# Patient Record
Sex: MALE | URBAN - METROPOLITAN AREA
[De-identification: names, ages, dates, MRNs, and addresses within clinical notes are randomized per-mention and may not be internally consistent; named-entity substitution may affect disease eponyms.]

---

## 2017-04-06 ENCOUNTER — HOSPITAL ENCOUNTER (OUTPATIENT)
Dept: CT IMAGING | Facility: HOSPITAL | Age: 47
Discharge: HOME | End: 2017-04-06

## 2017-04-06 ENCOUNTER — ALLSCRIPTS OFFICE VISIT (OUTPATIENT)
Dept: OTHER | Facility: OTHER | Age: 47
End: 2017-04-06

## 2017-04-06 ENCOUNTER — HOSPITAL ENCOUNTER (OUTPATIENT)
Dept: ULTRASOUND IMAGING | Facility: HOSPITAL | Age: 47
Discharge: HOME | End: 2017-04-06

## 2017-04-06 ENCOUNTER — HOSPITAL ENCOUNTER (OUTPATIENT)
Dept: NON INVASIVE DIAGNOSTICS | Facility: CLINIC | Age: 47
Discharge: HOME | End: 2017-04-06

## 2017-04-06 ENCOUNTER — APPOINTMENT (OUTPATIENT)
Dept: LAB | Facility: CLINIC | Age: 47
End: 2017-04-06

## 2017-04-06 ENCOUNTER — TRANSCRIBE ORDERS (OUTPATIENT)
Dept: LAB | Facility: CLINIC | Age: 47
End: 2017-04-06

## 2017-04-06 ENCOUNTER — OFFICE VISIT (OUTPATIENT)
Dept: LAB | Facility: CLINIC | Age: 47
End: 2017-04-06

## 2017-04-06 DIAGNOSIS — Z00.00 ENCOUNTER FOR GENERAL ADULT MEDICAL EXAMINATION WITHOUT ABNORMAL FINDINGS: ICD-10-CM

## 2017-04-06 DIAGNOSIS — Z00.00 ROUTINE GENERAL MEDICAL EXAMINATION AT A HEALTH CARE FACILITY: ICD-10-CM

## 2017-04-06 DIAGNOSIS — Z00.00 ROUTINE GENERAL MEDICAL EXAMINATION AT A HEALTH CARE FACILITY: Primary | ICD-10-CM

## 2017-04-06 LAB
25(OH)D3 SERPL-MCNC: 10.5 NG/ML (ref 30–100)
ALBUMIN SERPL BCP-MCNC: 4.2 G/DL (ref 3.5–5)
ALP SERPL-CCNC: 81 U/L (ref 46–116)
ALT SERPL W P-5'-P-CCNC: 48 U/L (ref 12–78)
ANION GAP SERPL CALCULATED.3IONS-SCNC: 8 MMOL/L (ref 4–13)
AST SERPL W P-5'-P-CCNC: 32 U/L (ref 5–45)
ATRIAL RATE: 62 BPM
ATRIAL RATE: 62 BPM
BACTERIA UR QL AUTO: ABNORMAL /HPF
BASOPHILS # BLD AUTO: 0.03 THOUSANDS/ΜL (ref 0–0.1)
BASOPHILS NFR BLD AUTO: 1 % (ref 0–1)
BILIRUB SERPL-MCNC: 0.3 MG/DL (ref 0.2–1)
BILIRUB UR QL STRIP: NEGATIVE
BUN SERPL-MCNC: 12 MG/DL (ref 5–25)
CALCIUM SERPL-MCNC: 8.8 MG/DL (ref 8.3–10.1)
CHEST PAIN STATEMENT: NORMAL
CHLORIDE SERPL-SCNC: 103 MMOL/L (ref 100–108)
CHOLEST SERPL-MCNC: 206 MG/DL (ref 50–200)
CLARITY UR: CLEAR
CO2 SERPL-SCNC: 31 MMOL/L (ref 21–32)
COLOR UR: YELLOW
CREAT SERPL-MCNC: 1.07 MG/DL (ref 0.6–1.3)
CRP SERPL HS-MCNC: <0.9 MG/L
EOSINOPHIL # BLD AUTO: 0.18 THOUSAND/ΜL (ref 0–0.61)
EOSINOPHIL NFR BLD AUTO: 3 % (ref 0–6)
ERYTHROCYTE [DISTWIDTH] IN BLOOD BY AUTOMATED COUNT: 13.1 % (ref 11.6–15.1)
EST. AVERAGE GLUCOSE BLD GHB EST-MCNC: 103 MG/DL
GFR SERPL CREATININE-BSD FRML MDRD: >60 ML/MIN/1.73SQ M
GLUCOSE P FAST SERPL-MCNC: 101 MG/DL (ref 65–99)
GLUCOSE UR STRIP-MCNC: NEGATIVE MG/DL
HBA1C MFR BLD: 5.2 % (ref 4.2–6.3)
HCT VFR BLD AUTO: 46.7 % (ref 36.5–49.3)
HDLC SERPL-MCNC: 53 MG/DL (ref 40–60)
HGB BLD-MCNC: 16 G/DL (ref 12–17)
HGB UR QL STRIP.AUTO: NEGATIVE
KETONES UR STRIP-MCNC: NEGATIVE MG/DL
LDLC SERPL CALC-MCNC: 121 MG/DL (ref 0–100)
LEUKOCYTE ESTERASE UR QL STRIP: NEGATIVE
LYMPHOCYTES # BLD AUTO: 1.8 THOUSANDS/ΜL (ref 0.6–4.47)
LYMPHOCYTES NFR BLD AUTO: 32 % (ref 14–44)
MAX DIASTOLIC BP: 82 MMHG
MAX HEART RATE: 176 BPM
MAX PREDICTED HEART RATE: 172 BPM
MAX. SYSTOLIC BP: 160 MMHG
MCH RBC QN AUTO: 32.5 PG (ref 26.8–34.3)
MCHC RBC AUTO-ENTMCNC: 34.3 G/DL (ref 31.4–37.4)
MCV RBC AUTO: 95 FL (ref 82–98)
MONOCYTES # BLD AUTO: 0.67 THOUSAND/ΜL (ref 0.17–1.22)
MONOCYTES NFR BLD AUTO: 12 % (ref 4–12)
NEUTROPHILS # BLD AUTO: 2.92 THOUSANDS/ΜL (ref 1.85–7.62)
NEUTS SEG NFR BLD AUTO: 52 % (ref 43–75)
NITRITE UR QL STRIP: NEGATIVE
NON-SQ EPI CELLS URNS QL MICRO: ABNORMAL /HPF
P AXIS: 30 DEGREES
P AXIS: 32 DEGREES
PH UR STRIP.AUTO: 6.5 [PH] (ref 4.5–8)
PLATELET # BLD AUTO: 189 THOUSANDS/UL (ref 149–390)
PMV BLD AUTO: 10.1 FL (ref 8.9–12.7)
POTASSIUM SERPL-SCNC: 4.4 MMOL/L (ref 3.5–5.3)
PR INTERVAL: 140 MS
PR INTERVAL: 140 MS
PROT SERPL-MCNC: 7.3 G/DL (ref 6.4–8.2)
PROT UR STRIP-MCNC: ABNORMAL MG/DL
PROTOCOL NAME: NORMAL
PSA SERPL-MCNC: 2 NG/ML (ref 0–4)
QRS AXIS: 46 DEGREES
QRS AXIS: 49 DEGREES
QRSD INTERVAL: 78 MS
QRSD INTERVAL: 78 MS
QT INTERVAL: 392 MS
QT INTERVAL: 394 MS
QTC INTERVAL: 397 MS
QTC INTERVAL: 399 MS
RBC # BLD AUTO: 4.93 MILLION/UL (ref 3.88–5.62)
RBC #/AREA URNS AUTO: ABNORMAL /HPF
REASON FOR TERMINATION: NORMAL
SODIUM SERPL-SCNC: 142 MMOL/L (ref 136–145)
SP GR UR STRIP.AUTO: 1.01 (ref 1–1.03)
T WAVE AXIS: 30 DEGREES
T WAVE AXIS: 35 DEGREES
TARGET HR FORMULA: NORMAL
TEST INDICATION: NORMAL
TIME IN EXERCISE PHASE: 780 S
TRIGL SERPL-MCNC: 158 MG/DL
TSH SERPL DL<=0.05 MIU/L-ACNC: 2.65 UIU/ML (ref 0.36–3.74)
UROBILINOGEN UR QL STRIP.AUTO: 0.2 E.U./DL
VENTRICULAR RATE: 62 BPM
VENTRICULAR RATE: 62 BPM
WBC # BLD AUTO: 5.6 THOUSAND/UL (ref 4.31–10.16)
WBC #/AREA URNS AUTO: ABNORMAL /HPF

## 2017-04-06 PROCEDURE — 84443 ASSAY THYROID STIM HORMONE: CPT

## 2017-04-06 PROCEDURE — 93005 ELECTROCARDIOGRAM TRACING: CPT

## 2017-04-06 PROCEDURE — 81001 URINALYSIS AUTO W/SCOPE: CPT

## 2017-04-06 PROCEDURE — 86141 C-REACTIVE PROTEIN HS: CPT

## 2017-04-06 PROCEDURE — 80053 COMPREHEN METABOLIC PANEL: CPT

## 2017-04-06 PROCEDURE — 75571 CT HRT W/O DYE W/CA TEST: CPT

## 2017-04-06 PROCEDURE — 76700 US EXAM ABDOM COMPLETE: CPT

## 2017-04-06 PROCEDURE — 80061 LIPID PANEL: CPT

## 2017-04-06 PROCEDURE — 84153 ASSAY OF PSA TOTAL: CPT

## 2017-04-06 PROCEDURE — 93306 TTE W/DOPPLER COMPLETE: CPT

## 2017-04-06 PROCEDURE — 83036 HEMOGLOBIN GLYCOSYLATED A1C: CPT

## 2017-04-06 PROCEDURE — 93350 STRESS TTE ONLY: CPT

## 2017-04-06 PROCEDURE — 36415 COLL VENOUS BLD VENIPUNCTURE: CPT

## 2017-04-06 PROCEDURE — 85025 COMPLETE CBC W/AUTO DIFF WBC: CPT

## 2017-04-06 PROCEDURE — 82306 VITAMIN D 25 HYDROXY: CPT

## 2017-10-20 ENCOUNTER — APPOINTMENT (OUTPATIENT)
Dept: URBAN - METROPOLITAN AREA CLINIC 200 | Age: 47
Setting detail: DERMATOLOGY
End: 2017-10-24

## 2017-10-20 DIAGNOSIS — L57.0 ACTINIC KERATOSIS: ICD-10-CM

## 2017-10-20 DIAGNOSIS — L57.8 OTHER SKIN CHANGES DUE TO CHRONIC EXPOSURE TO NONIONIZING RADIATION: ICD-10-CM

## 2017-10-20 PROBLEM — J30.1 ALLERGIC RHINITIS DUE TO POLLEN: Status: ACTIVE | Noted: 2017-10-20

## 2017-10-20 PROCEDURE — OTHER LIQUID NITROGEN: OTHER

## 2017-10-20 PROCEDURE — 17003 DESTRUCT PREMALG LES 2-14: CPT

## 2017-10-20 PROCEDURE — OTHER COUNSELING: OTHER

## 2017-10-20 PROCEDURE — 99213 OFFICE O/P EST LOW 20 MIN: CPT | Mod: 25

## 2017-10-20 PROCEDURE — 17000 DESTRUCT PREMALG LESION: CPT

## 2017-10-20 ASSESSMENT — LOCATION DETAILED DESCRIPTION DERM
LOCATION DETAILED: LEFT CENTRAL TEMPLE
LOCATION DETAILED: RIGHT SUPERIOR MEDIAL UPPER BACK
LOCATION DETAILED: RIGHT MEDIAL TEMPLE

## 2017-10-20 ASSESSMENT — LOCATION SIMPLE DESCRIPTION DERM
LOCATION SIMPLE: LEFT TEMPLE
LOCATION SIMPLE: RIGHT TEMPLE
LOCATION SIMPLE: RIGHT UPPER BACK

## 2017-10-20 ASSESSMENT — LOCATION ZONE DERM
LOCATION ZONE: FACE
LOCATION ZONE: TRUNK

## 2017-10-20 NOTE — PROCEDURE: LIQUID NITROGEN
Post-Care Instructions: I reviewed with the patient in detail post-care instructions. Patient is to wear sunprotection, and avoid picking at any of the treated lesions. Pt may apply Vaseline to crusted or scabbing areas.
Consent: The patient's consent was obtained including but not limited to risks of crusting, scabbing, blistering, scarring, darker or lighter pigmentary change, recurrence, incomplete removal and infection.
Number Of Freeze-Thaw Cycles: 2 freeze-thaw cycles
Detail Level: Detailed
Duration Of Freeze Thaw-Cycle (Seconds): 10
Render Post-Care Instructions In Note?: no

## 2018-01-22 VITALS
SYSTOLIC BLOOD PRESSURE: 132 MMHG | RESPIRATION RATE: 12 BRPM | BODY MASS INDEX: 23.95 KG/M2 | HEIGHT: 70 IN | DIASTOLIC BLOOD PRESSURE: 80 MMHG | TEMPERATURE: 96.6 F | HEART RATE: 68 BPM | WEIGHT: 167.3 LBS

## 2018-03-07 NOTE — H&P
Executive Physical Summary:   Carmine Queen, please continue your regular exercise, and healthy diet during the day  Avoiding snacking at night is a good goal     Because of the change in vision you have noticed, please make an appointment with her ophthalmologist     Suzanne Orellana are fair skinned with blue eyes and have had sun exposure in the past  I recommend once yearly dermatology screening  Please wear hat when outdoors, continue to wear sunglasses on michelle days, and wear sunscreen consistently  Dermatology: for your eczema: Please consider applying Eucerin cream just after taking a shower each day  It appears that the lipoma on her left neck is regrowing  This seems completely benign and does not need to be removed unless he would decide to do so  Urology follow-up: With your family history of prostate cancer, and recent PSA of 2 5, your active follow-up with urologist should continue for prostate cancer screening  Please continue to stay well hydrated and report any symptoms of renal colic to your urologist  Adding lemon to your water may also decrease the chance of kidney stones  Low Vitamin D level: please take over-the-counter Vitamin D3 2000 units once a day  Continue Advair which seems to be controlling your mild persistent asthma quite well  Please have your rescue inhaler on hand just in case  Regarding your concerns about her mother having lung cancer: Today's CT for lung cancer screening is a good start  Current guidelines do not recommend routine screening of this sort in your risk category  Fourthly, despite her family history and history of smoking more than 20 years ago, it appears your low risk  Nutritional Summary and Recommendations:   Carmine Queen presents for nutrition counseling and education  Food recall identifies suboptimal nutrient and energy balance  Body composition results reveal a desired improvement in body fat mass   Lab values reveal deficient serum Vit D and elevated triglycerides  Nutrition therapy discussed  HT: 69 5", WT: 165 2#, BMI 24 0, BMR 1659 kcal, Fat Mass 41 0#, Desired fat mass 35 0#  Meds: reviewed  Labs: 4/6 Glu 101, Chol 206, , Trig 158, HDL 53, Vit D 10 5, HgbA1C 5 2  Goals:  1  Lacy Abernathy will consume 15-25 grams protein per meals and 10-20 gram protein per 1-2 snack daily for a daily total of 90 gram protein  2  Lacy Abernathy will food journal with [facility] will have a Vit D level wnl in 6 months  Expected Compliance: Very Good Perceived Comprehension: Very Good  Fitness Summary and Recommendations:    Lacy Abernathy scored at the 25% on his body composition assessment with a bodyfat % of 24 8%  Lacy Abernathy scored in the above average range for flexibility with a sit & reach score of 27 cm  His Muscle Strength/Endurance scores placed him at the 55% (lower body) and 80% (upper body) with a chair stand score of 24 and arm curl score of 25 respectfully  Christopher's Cardiovascular Score of 57 2 placed him at the 95%  Overall Lacy Abernathy would be considered to have an above average fitness level with a 65% score  Continued emphasis on regular exercise and sound nutrition practices will enable Lacy Abernathy to maintain his optimal level of fitness  Hearing Assessment Summary:   Mr Tabatha Prince was seen for a hearing evaluation as part of his [facility] Physical  Otoscopy was normal bilaterally  Tympanometry was normal bilaterally  Pure tone audiometry revealed normal hearing with excellent word recognition bilaterally  He should have his hearing re evaluated every other year, or soon if he feels there is a problem                             Electronically signed by : SHERYL Barrera ; Apr 6 2017  2:33PM EST                       (Author)

## 2018-03-07 NOTE — H&P
HPI Data Review  Johnathan Barajas is a very nice 44-year-old man who is here for his first [facility] physical  Johnathan Barajas does not have any acute complaints today  In addition to taking her verbal history, Christopher's health history form was also reviewed  His biggest health concerns are related to cancer prevention  He has a very strong family history of prostate cancer in his father, grandfather, great grandfather and uncle all on his father's side  He is actively followed by his urologist and recent PSA was 2 5  Johnathan Barajas has regular prostate exams, PSA measurement, and currently has no urinary symptoms  His mother  of lung cancer and Johnathan Barajas did smoke until  and had substantial secondhand smoke exposure as a child  Today's testing was expanded to include low-dose lungs CTA  Johnathan Barajas has a history of nephrolithiasis with 2 episodes of symptomatic renal colic, once 2 years ago and once in February of this year  He is followed actively by his urologist  Johnathan Barajas is undergoing testing to identify the type of kidney stones he has  He underwent lithotripsy in February of this year  Currently he has no hematuria, dysuria, flank pain or other symptoms of renal colic  He has substantially increase his fluid intake  Johnathan Barajas has lifelong eczema and environmental allergies to mold and mildew, and possibly to the 2 cats he hasn't home  He has mild intermittent asthma followed by his primary care doctor and uses Advair 100/50 one relation once daily  This controls his asthma quite well year-round  He does have a rescue medicine but rarely uses this  Review of Systems    Constitutional: No fever or chills, feels well, no tiredness, no recent weight gain or weight loss  Eyes: eyesight problems and Some declining near vision recently  , but no eye pain, no dryness of the eyes, eyes not red, no purulent discharge from the eyes and no itching of the eyes     ENT: hearing loss and Some subtle loss of hearing, very gradual , but no earache, no nosebleeds, no sore throat, no nasal discharge and no hoarseness  Cardiovascular: No complaints of slow heart rate, no fast heart rate, no chest pain, no palpitations, no leg claudication, no lower extremity  Respiratory: wheezing and Occasional mild wheezing with underlying mild persistent asthma on Advair  , but no shortness of breath, no cough, no orthopnea, no shortness of breath during exertion and no PND  Gastrointestinal: No complaints of abdominal pain, no constipation, no nausea or vomiting, no diarrhea or bloody stools  Genitourinary: No complaints of dysuria, no incontinence, no hesitancy, no nocturia, no genital lesion, no testicular pain  Musculoskeletal: myalgias and Occasional stiffness and pain at the origin of the left trapezius muscle in the occipital zone of the head  This seems to be related to suboptimal posture especially looking a computer at work , but no arthralgias, no joint swelling, no limb pain, no joint stiffness and no limb swelling  Integumentary: a rash, itching, dry skin, skin lesion and Chronic eczema with dry skin and occasional itching, skin tags ringing the neck, and some red marks which on exam or hemangiomas  , but no skin wound  Neurological: No compliants of headache, no confusion, no convulsions, no numbness or tingling, no dizziness or fainting, no limb weakness, no difficulty walking  Psychiatric: Is not suicidal, no sleep disturbances, no anxiety or depression, no change in personality, no emotional problems  Endocrine: No complaints of proptosis, no hot flashes, no muscle weakness, no erectile dysfunction, no deepening of the voice, no feelings of weakness  Hematologic/Lymphatic: No complaints of swollen glands, no swollen glands in the neck, does not bleed easily, no easy bruising  Past Medical History  No significant past medical problems  This includes no history of possible sedation for medical reasons  Surgical History    1   History of Inguinal Hernia Repair   2  History of Renal Lithotripsy  Right inguinal hernia repair      2 lithotripsies, the latest was in February of this year for symptomatically colic with retained kidney stones  Removal of lipoma of left neck  Family History  Mother    1  Family history of lung cancer (V16 1) (Z80 1)  Father    2  Family history of malignant neoplasm of prostate (V16 42) (Z80 45)  Paternal Grandfather    3  Family history of malignant neoplasm of prostate (V16 42) (Z80 45)  Paternal Uncle    4  Family history of malignant neoplasm of prostate (V16 42) (Z80 45)  Father: His father is alive age 76 with prostate cancer in remission  Christopher's paternal grandfather, paternal great-grandfather and a paternal uncle all had prostate cancer  Mother: Mother  at the age of 77 of lung cancer and smoked heavily during her life  Siblings: Alla Wilde has 2 brothers, ages 46 and 48, one with high cholesterol but they both generally good health  Children: 2 teenage children who are in good health  Social History    · Currently working   · Exercise: Running   · Exercise: Weight training   · Former smoker (D50 05) (Z49 480)   ·   Alla Wilde is , works full-time as a , exercises regularly and his diet seems optimal other than eating at night, which is generally snacking  He is a former smoker having quit smoking in   He had substantial secondhand smoke exposure as a child as both of his parents smoked  He has 1-2 cups of coffee a day and alcohol intake is very modest  He enjoys playing golf with his son 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       Allergies    1   Codeine Derivatives    Vitals  Signs   Recorded: [id number] 09:58AM   Temperature: 96 6 F, Tympanic  Heart Rate: 68, L Radial  Pulse Quality: Regular, L Radial  Respiration Quality: Normal  Respiration: 12  Systolic: 642, LUE, Supine  Diastolic: 80, LUE, Supine  Height: 5 ft 9 75 in  Weight: 167 lb 4 8 oz  BMI Calculated: 24 18  BSA Calculated: 1 93    Physical Exam  VA: OD- 20/15, OS- 20/30     Constitutional   General appearance: No acute distress, well appearing and well nourished  Head and Face   Head and face: Normal   Slight male pattern balding  There is no acute traumatic  Eyes   Conjunctiva and lids: No erythema, swelling or discharge  Pupils and irises: Equal, round, reactive to light  Pupils 3 mm with normal direct and consensual light response, extraocular eye movements intact  Ophthalmoscopic examination: Normal fundi and optic discs  No hemorrhages or exudates, normal vasculature, normal optic disks  Ears, Nose, Mouth, and Throat   External inspection of ears and nose: Normal     Otoscopic examination: Tympanic membranes translucent with normal light reflex  Canals patent without erythema  Normal external otic canals with some cerumen buildup, normal tympanic membranes  Hearing: Normal     Nasal mucosa, septum, and turbinates: Normal without edema or erythema  No septal deviation, slight nasal membrane swelling, with some clear discharge, no polyps or masses  Lips, teeth, and gums: Normal, good dentition  Oropharynx: Normal with no erythema, edema, exudate or lesions  Very slight postnasal drainage which is clear  No airway compromise  Neck   Neck: Supple, symmetric, trachea midline, no masses  Supple, without trauma or tenderness, no deformity  Thyroid: Normal, no thyromegaly  No tenderness, no enlargement, no mass or nodularity  Pulmonary   Respiratory effort: No increased work of breathing or signs of respiratory distress  Auscultation of lungs: Clear to auscultation  Cardiovascular   Auscultation of heart: Normal rate and rhythm, normal S1 and S2, no murmurs  Carotid pulses: 2+ bilaterally  Abdominal aorta: Normal     Pedal pulses: 2+ bilaterally      Peripheral vascular exam: Normal     Examination of extremities for edema and/or varicosities: Normal     Abdomen   Abdomen: Non-tender, no masses  Well-healed old hypogastric scar, midline and transverse consistent with remote history of right herniorrhaphy  No abdominal renal hernia noted  Liver and spleen: No hepatomegaly or splenomegaly  Examination for hernias: No hernias appreciated  Lymphatic   Palpation of lymph nodes in neck: No lymphadenopathy  Palpation of lymph nodes in other areas: No lymphadenopathy  Musculoskeletal   Gait and station: Normal     Inspection/palpation of digits and nails: Normal without clubbing or cyanosis  Inspection/palpation of joints, bones, and muscles: Normal     Range of motion: Normal     Stability: Normal     Muscle strength/tone: Normal     Skin   Skin and subcutaneous tissue: Normal without rashes or lesions  There is eczema of the trunk and proximal extremities, though there is a well-healed scar of the left posterior cervical region at site of previous lipoma removal with evidence of recurrent lipoma with an easily palpated soft nontender subcutaneous skin mass consistent with lipoma  There are scattered hemangiomas, there are skin tags ringing the neck, these are small  There are actinic keratoses of the scalp  There is no evidence of skin malignancy  Neurologic   Cranial nerves: Cranial nerves 2-12 intact  Cortical function: Normal mental status  Reflexes: 2+ and symmetric  Sensation: No sensory loss  Coordination: Normal finger to nose and heel to shin  Psychiatric   Judgment and insight: Normal     Orientation to person, place and time: Normal     Recent and remote memory: Intact  Mood and affect: Normal        Procedure    Procedure: Hearing Acuity Test    Indication: Routine screeing  Audiometry: Normal bilaterally  Hearing in the right ear: 5 decibals at 500 hertz, 5 decibals at 1000 hertz, 10 decibals at 2000 hertz, 10 decibals at 4000 hertz and 5 decibals at 8000 hertz     Hearing in the left ear: 5 decibals at 500 hertz, 5 decibals at 1000 hertz, 10 decibals at 2000 hertz, 15 decibals at 4000 hertz and 5 decibals at 8000 hertz  The patient was cooperative, but Tolerated the procedure well  Procedure: Audiological Assessment  Indication: tinnitus  History: exposure to a high level of environmental noise  Assessment:   Otoscopy:   Right Otoscopy: normal    Left Otoscopy: normal    Tympanometry:   Right Tympanometry: normal    Left Tympanometry: normal    Otoacoustic Emissions:   Audiometric Test:   Tone Audiometry:   Right Tone Audiometry: normal    Left Tone Audiometry: normal    Right Speech Audiometry: SRT: 5 dBHL  Discrimination: 100% at 40 dBHL  Left Speech Audiometry: SRT: 5 dBHL  Discrimination: 100% at 40 dBHL  Reliability: good  Impression: hearing was normal bilaterally  Recommendation: Biannual hearing evaluation or PRN  Cardiology Consultation  Mr Karen Gomez has no history of cardiac disease in the past  He is completely asymptomatic, and exercises on a regular basis  His blood pressure was within an acceptable range  From a lab perspective, his LDL cholesterol was borderline elevated, but his HDL cholesterol was excellent  He did have an elevated triglycerides at 206  His C-Reactive Protein was normal  His ECG was normal, his echocardiogram demonstrated a structurally normal heart, and his stress test demonstrated excellent exercise capacity with no evidence of ischemia  His CT Coronary Calcium Score was zero, suggesting low risk for coronary artery disease  Impression:  1  Excellent cardiovascular health - ACC score of 2 9% 10 year risk for heart disease or stroke  2  Borderline dyslipidemia - borderline elevated LDL ("Bad") cholesterol, and elevated triglycerides  Recommendations:  1  Continue exercise regimen  2  Dietary modification per nutritionist       Audiology Assessment  Mr Karen Gomez was seen for a hearing evaluation as part of his [facility] Physical  Otoscopy was normal bilaterally   Tympanometry was normal bilaterally  Pure tone audiometry revealed normal hearing with excellent word recognition bilaterally  He should have his hearing re evaluated every other year, or soon if he feels there is a problem  Fitness Assessment  Mary Michel scored at the 25% on his body composition assessment with a bodyfat % of 24 8%  Mary Michel scored in the above average range for flexibility with a sit & reach score of 27 cm  His Muscle Strength/Endurance scores placed him at the 55% (lower body) and 80% (upper body) with a chair stand score of 24 and arm curl score of 25 respectfully  Christopher's Cardiovascular Score of 57 2 placed him at the 95%  Overall Mary Michel would be considered to have an above average fitness level with a 65% score  Continued emphasis on regular exercise and sound nutrition practices will enable Mary Michel to maintain his optimal level of fitness  Nutrition Assessment  Mary Michel presents for nutrition counseling and education  Food recall identifies suboptimal nutrient and energy balance  Body composition results reveal a desired improvement in body fat mass  Lab values reveal deficient serum Vit D and elevated triglycerides  Nutrition therapy discussed   HT: 69 5", WT: 165 2#, BMI 24 0, BMR 1659 kcal, Fat Mass 41 0#, Desired fat mass 35 0#  Meds: reviewed  Labs: 4/6 Glu 101, Chol 206, , Trig 158, HDL 53, Vit D 10 5, HgbA1C 5 2  Goals:  1  Mary Michel will consume 15-25 grams protein per meals and 10-20 gram protein per 1-2 snack daily for a daily total of 90 gram protein  2  Mary Michel will food journal with [address] will have a Vit D level wnl in 6 months  Expected Compliance: Very Good Perceived Comprehension: Very Good      Assessment    1  Nephrolithiasis (592 0) (N20 0)   2  History of Inguinal Hernia Repair   3  History of Renal Lithotripsy   4  Family history of malignant neoplasm of prostate (V16 42) (B55 36) : Father, Paternal   Tilda Nabil, Paternal Uncle   11   Family history of lung cancer (V16 1) (Z80 1) : Mother   6  Former smoker (V15 82) (K49 596)   7     8  Currently working   9  Exercise: Running   10  Exercise: Weight training   11  Encounter for preventive health examination (V70 0) (Z00 00)   12  Elevated PSA (790 93) (R97 20)   13  Mild persistent asthma (493 90) (J45 30)   14  Consumes alcohol occasionally (V49 89) (Z78 9)   15  History of Dermatological Surgery   16  Vitamin D deficiency (268 9) (E55 9)    Plan     1  Advair Diskus 100-50 MCG/DOSE Inhalation Aerosol Powder Breath Activated;   INHALE 1 PUFFS Every twelve hours   2  ProAir  (90 Base) MCG/ACT Inhalation Aerosol Solution; INHALE 2 PUFFS   EVERY 4 HOURS AS NEEDED    * CT LUNG SCREENING PROGRAM; Status:Resulted - Requires Verification;   Done: [id number] 12:00AM  Due:06Apr2018; Ordered;    For:Health Maintenance; Ordered By:Praneeth Carter;      Discussion/Summary    Executive Physical Summary:   Jared Villalpando, please continue your regular exercise, and healthy diet during the day  Avoiding snacking at night is a good goal     Because of the change in vision you have noticed, please make an appointment with her ophthalmologist     El Bran are fair skinned with blue eyes and have had sun exposure in the past  I recommend once yearly dermatology screening  Please wear hat when outdoors, continue to wear sunglasses on michelle days, and wear sunscreen consistently  Dermatology: for your eczema: Please consider applying Eucerin cream just after taking a shower each day  It appears that the lipoma on her left neck is regrowing  This seems completely benign and does not need to be removed unless he would decide to do so  Urology follow-up: With your family history of prostate cancer, and recent PSA of 2 5, your active follow-up with urologist should continue for prostate cancer screening   Please continue to stay well hydrated and report any symptoms of renal colic to your urologist  Adding lemon to your water may also decrease the chance of kidney stones  Low Vitamin D level: please take over-the-counter Vitamin D3 2000 units once a day  Continue Advair which seems to be controlling your mild persistent asthma quite well  Please have your rescue inhaler on hand just in case  Regarding your concerns about her mother having lung cancer: Today's CT for lung cancer screening is a good start  Current guidelines do not recommend routine screening of this sort in your risk category  Fourthly, despite her family history and history of smoking more than 20 years ago, it appears your low risk  Nutritional Summary and Recommendations:   Marco Higuera presents for nutrition counseling and education  Food recall identifies suboptimal nutrient and energy balance  Body composition results reveal a desired improvement in body fat mass  Lab values reveal deficient serum Vit D and elevated triglycerides  Nutrition therapy discussed  HT: 69 5", WT: 165 2#, BMI 24 0, BMR 1659 kcal, Fat Mass 41 0#, Desired fat mass 35 0#  Meds: reviewed  Labs: 4/6 Glu 101, Chol 206, , Trig 158, HDL 53, Vit D 10 5, HgbA1C 5 2  Goals:  1  Marco Higuera will consume 15-25 grams protein per meals and 10-20 gram protein per 1-2 snack daily for a daily total of 90 gram protein  2  Marco Higuera will food journal with [address] will have a Vit D level wnl in 6 months  Expected Compliance: Very Good Perceived Comprehension: Very Good  Fitness Summary and Recommendations:    Marco Higuera scored at the 25% on his body composition assessment with a bodyfat % of 24 8%  Marco Higuera scored in the above average range for flexibility with a sit & reach score of 27 cm  His Muscle Strength/Endurance scores placed him at the 55% (lower body) and 80% (upper body) with a chair stand score of 24 and arm curl score of 25 respectfully  Christopher's Cardiovascular Score of 57 2 placed him at the 95%  Overall Marco Higuera would be considered to have an above average fitness level with a 65% score   Continued emphasis on regular exercise and sound nutrition practices will enable Cynthia Enriquez to maintain his optimal level of fitness  Hearing Assessment Summary:   Mr Anirudh Gerardo was seen for a hearing evaluation as part of his [facility] Physical  Otoscopy was normal bilaterally  Tympanometry was normal bilaterally  Pure tone audiometry revealed normal hearing with excellent word recognition bilaterally  He should have his hearing re evaluated every other year, or soon if he feels there is a problem                            Signatures   Electronically signed by : SHERYL Erickson ; Apr 6 2017  2:33PM EST                       (Author)

## 2019-08-13 ENCOUNTER — APPOINTMENT (OUTPATIENT)
Dept: URBAN - METROPOLITAN AREA CLINIC 200 | Age: 49
Setting detail: DERMATOLOGY
End: 2019-08-28

## 2019-08-13 DIAGNOSIS — L57.8 OTHER SKIN CHANGES DUE TO CHRONIC EXPOSURE TO NONIONIZING RADIATION: ICD-10-CM

## 2019-08-13 DIAGNOSIS — L82.0 INFLAMED SEBORRHEIC KERATOSIS: ICD-10-CM

## 2019-08-13 PROCEDURE — OTHER REASSURANCE: OTHER

## 2019-08-13 PROCEDURE — 99213 OFFICE O/P EST LOW 20 MIN: CPT

## 2019-08-13 PROCEDURE — OTHER COUNSELING: OTHER

## 2019-08-13 ASSESSMENT — LOCATION DETAILED DESCRIPTION DERM
LOCATION DETAILED: LEFT MEDIAL SUPERIOR CHEST
LOCATION DETAILED: LEFT SUPERIOR LATERAL UPPER BACK
LOCATION DETAILED: LEFT LATERAL UPPER BACK

## 2019-08-13 ASSESSMENT — LOCATION ZONE DERM: LOCATION ZONE: TRUNK

## 2019-08-13 ASSESSMENT — LOCATION SIMPLE DESCRIPTION DERM
LOCATION SIMPLE: CHEST
LOCATION SIMPLE: LEFT UPPER BACK

## 2020-08-25 ENCOUNTER — APPOINTMENT (OUTPATIENT)
Dept: URBAN - METROPOLITAN AREA CLINIC 200 | Age: 50
Setting detail: DERMATOLOGY
End: 2020-08-28

## 2020-08-25 DIAGNOSIS — L91.8 OTHER HYPERTROPHIC DISORDERS OF THE SKIN: ICD-10-CM

## 2020-08-25 DIAGNOSIS — L57.8 OTHER SKIN CHANGES DUE TO CHRONIC EXPOSURE TO NONIONIZING RADIATION: ICD-10-CM

## 2020-08-25 DIAGNOSIS — L57.0 ACTINIC KERATOSIS: ICD-10-CM

## 2020-08-25 PROCEDURE — 17003 DESTRUCT PREMALG LES 2-14: CPT

## 2020-08-25 PROCEDURE — OTHER COUNSELING: OTHER

## 2020-08-25 PROCEDURE — 99213 OFFICE O/P EST LOW 20 MIN: CPT | Mod: 25

## 2020-08-25 PROCEDURE — OTHER REASSURANCE: OTHER

## 2020-08-25 PROCEDURE — OTHER LIQUID NITROGEN: OTHER

## 2020-08-25 PROCEDURE — 17000 DESTRUCT PREMALG LESION: CPT

## 2020-08-25 ASSESSMENT — LOCATION SIMPLE DESCRIPTION DERM
LOCATION SIMPLE: RIGHT FOREHEAD
LOCATION SIMPLE: RIGHT ANTERIOR NECK
LOCATION SIMPLE: ABDOMEN
LOCATION SIMPLE: RIGHT AXILLARY VAULT
LOCATION SIMPLE: LEFT OCCIPITAL SCALP
LOCATION SIMPLE: CHEST

## 2020-08-25 ASSESSMENT — LOCATION ZONE DERM
LOCATION ZONE: TRUNK
LOCATION ZONE: FACE
LOCATION ZONE: SCALP
LOCATION ZONE: NECK
LOCATION ZONE: AXILLAE

## 2020-08-25 ASSESSMENT — LOCATION DETAILED DESCRIPTION DERM
LOCATION DETAILED: LEFT MEDIAL SUPERIOR CHEST
LOCATION DETAILED: RIGHT FOREHEAD
LOCATION DETAILED: RIGHT INFERIOR LATERAL NECK
LOCATION DETAILED: RIGHT CLAVICULAR NECK
LOCATION DETAILED: RIGHT AXILLARY VAULT
LOCATION DETAILED: LEFT RIB CAGE
LOCATION DETAILED: LEFT SUPERIOR OCCIPITAL SCALP

## 2020-08-25 NOTE — PROCEDURE: LIQUID NITROGEN
Duration Of Freeze Thaw-Cycle (Seconds): 2
Detail Level: Detailed
Render Post-Care Instructions In Note?: no
Number Of Freeze-Thaw Cycles: 1 freeze-thaw cycle
Post-Care Instructions: I reviewed with the patient in detail post-care instructions. Patient is to wear sunprotection, and avoid picking at any of the treated lesions. Pt may apply Vaseline to crusted or scabbing areas.
Consent: The patient's consent was obtained including but not limited to risks of crusting, scabbing, blistering, scarring, darker or lighter pigmentary change, recurrence, incomplete removal and infection.

## 2021-02-10 ENCOUNTER — ANESTHESIA EVENT (OUTPATIENT)
Dept: OPERATING ROOM | Facility: HOSPITAL | Age: 51
Setting detail: HOSPITAL OUTPATIENT SURGERY
End: 2021-02-10
Payer: COMMERCIAL

## 2021-02-10 NOTE — ANESTHESIOLOGIST PRE-PROCEDURE CHART REVIEW
I confirm that I have reviewed the available information in the medical record prior to the scheduled surgery. Patient tested positive for covid Dec 2020@ [facility].

## 2021-02-11 ENCOUNTER — ANESTHESIA (OUTPATIENT)
Dept: OPERATING ROOM | Facility: HOSPITAL | Age: 51
Setting detail: HOSPITAL OUTPATIENT SURGERY
End: 2021-02-11
Payer: COMMERCIAL

## 2021-02-11 ENCOUNTER — HOSPITAL ENCOUNTER (OUTPATIENT)
Facility: HOSPITAL | Age: 51
Setting detail: HOSPITAL OUTPATIENT SURGERY
Discharge: HOME | End: 2021-02-11
Attending: UROLOGY | Admitting: UROLOGY
Payer: COMMERCIAL

## 2021-02-11 VITALS
HEIGHT: 70 IN | BODY MASS INDEX: 23.62 KG/M2 | WEIGHT: 165 LBS | RESPIRATION RATE: 14 BRPM | TEMPERATURE: 97 F | OXYGEN SATURATION: 99 % | DIASTOLIC BLOOD PRESSURE: 72 MMHG | HEART RATE: 72 BPM | SYSTOLIC BLOOD PRESSURE: 115 MMHG

## 2021-02-11 DIAGNOSIS — N20.1 CALCULUS OF URETER: ICD-10-CM

## 2021-02-11 PROCEDURE — 37000001 HC ANESTHESIA GENERAL: Performed by: UROLOGY

## 2021-02-11 PROCEDURE — C2617 STENT, NON-COR, TEM W/O DEL: HCPCS | Performed by: UROLOGY

## 2021-02-11 PROCEDURE — 71000011 HC PACU PHASE 1 EA ADDL MIN: Performed by: UROLOGY

## 2021-02-11 PROCEDURE — 0TC68ZZ EXTIRPATION OF MATTER FROM RIGHT URETER, VIA NATURAL OR ARTIFICIAL OPENING ENDOSCOPIC: ICD-10-PCS | Performed by: UROLOGY

## 2021-02-11 PROCEDURE — 0T768DZ DILATION OF RIGHT URETER WITH INTRALUMINAL DEVICE, VIA NATURAL OR ARTIFICIAL OPENING ENDOSCOPIC: ICD-10-PCS | Performed by: UROLOGY

## 2021-02-11 PROCEDURE — 25800000 HC PHARMACY IV SOLUTIONS: Performed by: UROLOGY

## 2021-02-11 PROCEDURE — 71000012 HC PACU PHASE 2 EA ADDL MIN: Performed by: UROLOGY

## 2021-02-11 PROCEDURE — 27200000 HC STERILE SUPPLY: Performed by: UROLOGY

## 2021-02-11 PROCEDURE — 63600000 HC DRUGS/DETAIL CODE: Performed by: ANESTHESIOLOGY

## 2021-02-11 PROCEDURE — C1758 CATHETER, URETERAL: HCPCS | Performed by: UROLOGY

## 2021-02-11 PROCEDURE — 36000003 HC OR LEVEL 3 INITIAL 30MIN: Performed by: UROLOGY

## 2021-02-11 PROCEDURE — 63700000 HC SELF-ADMINISTRABLE DRUG: Performed by: UROLOGY

## 2021-02-11 PROCEDURE — 25000000 HC PHARMACY GENERAL: Performed by: NURSE ANESTHETIST, CERTIFIED REGISTERED

## 2021-02-11 PROCEDURE — 63600000 HC DRUGS/DETAIL CODE: Performed by: NURSE ANESTHETIST, CERTIFIED REGISTERED

## 2021-02-11 PROCEDURE — 82365 CALCULUS SPECTROSCOPY: CPT | Performed by: UROLOGY

## 2021-02-11 PROCEDURE — 36000013 HC OR LEVEL 3 EA ADDL MIN: Performed by: UROLOGY

## 2021-02-11 PROCEDURE — 71000002 HC PACU PHASE 2 INITIAL 30MIN: Performed by: UROLOGY

## 2021-02-11 PROCEDURE — 63600105 HC IODINE BASED CONTRAST: Performed by: UROLOGY

## 2021-02-11 PROCEDURE — 63600000 HC DRUGS/DETAIL CODE

## 2021-02-11 PROCEDURE — 25000000 HC PHARMACY GENERAL: Performed by: UROLOGY

## 2021-02-11 PROCEDURE — 71000001 HC PACU PHASE 1 INITIAL 30MIN: Performed by: UROLOGY

## 2021-02-11 DEVICE — IMPLANTABLE DEVICE: Type: IMPLANTABLE DEVICE | Site: URETER | Status: FUNCTIONAL

## 2021-02-11 RX ORDER — SODIUM CHLORIDE 9 MG/ML
INJECTION, SOLUTION INTRAVENOUS CONTINUOUS
Status: DISCONTINUED | OUTPATIENT
Start: 2021-02-11 | End: 2021-02-11 | Stop reason: HOSPADM

## 2021-02-11 RX ORDER — CEFAZOLIN SODIUM 2 G/50ML
SOLUTION INTRAVENOUS
Status: COMPLETED
Start: 2021-02-11 | End: 2021-02-11

## 2021-02-11 RX ORDER — FENTANYL CITRATE 50 UG/ML
INJECTION, SOLUTION INTRAMUSCULAR; INTRAVENOUS AS NEEDED
Status: DISCONTINUED | OUTPATIENT
Start: 2021-02-11 | End: 2021-02-11 | Stop reason: SURG

## 2021-02-11 RX ORDER — CEFAZOLIN SODIUM 2 G/50ML
2 SOLUTION INTRAVENOUS
Status: COMPLETED | OUTPATIENT
Start: 2021-02-11 | End: 2021-02-11

## 2021-02-11 RX ORDER — ONDANSETRON HYDROCHLORIDE 2 MG/ML
4 INJECTION, SOLUTION INTRAVENOUS
Status: DISCONTINUED | OUTPATIENT
Start: 2021-02-11 | End: 2021-02-11 | Stop reason: HOSPADM

## 2021-02-11 RX ORDER — ONDANSETRON HYDROCHLORIDE 2 MG/ML
INJECTION, SOLUTION INTRAVENOUS AS NEEDED
Status: DISCONTINUED | OUTPATIENT
Start: 2021-02-11 | End: 2021-02-11 | Stop reason: SURG

## 2021-02-11 RX ORDER — FENTANYL CITRATE 50 UG/ML
50 INJECTION, SOLUTION INTRAMUSCULAR; INTRAVENOUS
Status: DISCONTINUED | OUTPATIENT
Start: 2021-02-11 | End: 2021-02-11 | Stop reason: HOSPADM

## 2021-02-11 RX ORDER — MIDAZOLAM HYDROCHLORIDE 2 MG/2ML
INJECTION, SOLUTION INTRAMUSCULAR; INTRAVENOUS AS NEEDED
Status: DISCONTINUED | OUTPATIENT
Start: 2021-02-11 | End: 2021-02-11 | Stop reason: SURG

## 2021-02-11 RX ORDER — CEPHALEXIN 250 MG/1
250 CAPSULE ORAL 3 TIMES DAILY
Qty: 15 CAPSULE | Refills: 0 | Status: SHIPPED | OUTPATIENT
Start: 2021-02-11 | End: 2021-02-16

## 2021-02-11 RX ORDER — OXYCODONE AND ACETAMINOPHEN 5; 325 MG/1; MG/1
1 TABLET ORAL ONCE
Status: COMPLETED | OUTPATIENT
Start: 2021-02-11 | End: 2021-02-11

## 2021-02-11 RX ORDER — HYDROMORPHONE HYDROCHLORIDE 1 MG/ML
0.5 INJECTION, SOLUTION INTRAMUSCULAR; INTRAVENOUS; SUBCUTANEOUS
Status: DISCONTINUED | OUTPATIENT
Start: 2021-02-11 | End: 2021-02-11 | Stop reason: HOSPADM

## 2021-02-11 RX ORDER — LIDOCAINE HYDROCHLORIDE 10 MG/ML
INJECTION, SOLUTION EPIDURAL; INFILTRATION; INTRACAUDAL; PERINEURAL AS NEEDED
Status: DISCONTINUED | OUTPATIENT
Start: 2021-02-11 | End: 2021-02-11 | Stop reason: SURG

## 2021-02-11 RX ORDER — PROPOFOL 10 MG/ML
INJECTION, EMULSION INTRAVENOUS AS NEEDED
Status: DISCONTINUED | OUTPATIENT
Start: 2021-02-11 | End: 2021-02-11 | Stop reason: SURG

## 2021-02-11 RX ORDER — DEXAMETHASONE SODIUM PHOSPHATE 4 MG/ML
INJECTION, SOLUTION INTRA-ARTICULAR; INTRALESIONAL; INTRAMUSCULAR; INTRAVENOUS; SOFT TISSUE AS NEEDED
Status: DISCONTINUED | OUTPATIENT
Start: 2021-02-11 | End: 2021-02-11 | Stop reason: SURG

## 2021-02-11 RX ORDER — OXYCODONE AND ACETAMINOPHEN 5; 325 MG/1; MG/1
TABLET ORAL
Status: DISCONTINUED
Start: 2021-02-11 | End: 2021-02-11 | Stop reason: HOSPADM

## 2021-02-11 RX ORDER — LIDOCAINE HYDROCHLORIDE 20 MG/ML
JELLY TOPICAL AS NEEDED
Status: DISCONTINUED | OUTPATIENT
Start: 2021-02-11 | End: 2021-02-11 | Stop reason: HOSPADM

## 2021-02-11 RX ADMIN — SODIUM CHLORIDE: 9 INJECTION, SOLUTION INTRAVENOUS at 11:52

## 2021-02-11 RX ADMIN — PROPOFOL 200 MG: 10 INJECTION, EMULSION INTRAVENOUS at 12:27

## 2021-02-11 RX ADMIN — CEFAZOLIN: 330 INJECTION, POWDER, FOR SOLUTION INTRAMUSCULAR; INTRAVENOUS at 11:52

## 2021-02-11 RX ADMIN — FENTANYL CITRATE 50 MCG: 50 INJECTION, SOLUTION INTRAMUSCULAR; INTRAVENOUS at 13:45

## 2021-02-11 RX ADMIN — LIDOCAINE HYDROCHLORIDE 5 ML: 10 INJECTION, SOLUTION EPIDURAL; INFILTRATION; INTRACAUDAL; PERINEURAL at 12:27

## 2021-02-11 RX ADMIN — MIDAZOLAM HYDROCHLORIDE 2 MG: 1 INJECTION, SOLUTION INTRAMUSCULAR; INTRAVENOUS at 12:22

## 2021-02-11 RX ADMIN — FENTANYL CITRATE 50 MCG: 50 INJECTION, SOLUTION INTRAMUSCULAR; INTRAVENOUS at 13:31

## 2021-02-11 RX ADMIN — DEXAMETHASONE SODIUM PHOSPHATE 4 MG: 4 INJECTION, SOLUTION INTRA-ARTICULAR; INTRALESIONAL; INTRAMUSCULAR; INTRAVENOUS; SOFT TISSUE at 12:37

## 2021-02-11 RX ADMIN — FENTANYL CITRATE 50 MCG: 50 INJECTION, SOLUTION INTRAMUSCULAR; INTRAVENOUS at 12:27

## 2021-02-11 RX ADMIN — ONDANSETRON 4 MG: 2 INJECTION INTRAMUSCULAR; INTRAVENOUS at 12:37

## 2021-02-11 RX ADMIN — PROPOFOL 50 MG: 10 INJECTION, EMULSION INTRAVENOUS at 12:28

## 2021-02-11 RX ADMIN — FENTANYL CITRATE 50 MCG: 50 INJECTION, SOLUTION INTRAMUSCULAR; INTRAVENOUS at 12:31

## 2021-02-11 RX ADMIN — CEFAZOLIN SODIUM: 2 SOLUTION INTRAVENOUS at 11:52

## 2021-02-11 RX ADMIN — OXYCODONE HYDROCHLORIDE AND ACETAMINOPHEN 1 TABLET: 5; 325 TABLET ORAL at 14:38

## 2021-02-11 ASSESSMENT — PAIN - FUNCTIONAL ASSESSMENT: PAIN_FUNCTIONAL_ASSESSMENT: 0-10

## 2021-02-11 ASSESSMENT — PAIN SCALES - GENERAL: PAIN_LEVEL: 1

## 2021-02-11 NOTE — ANESTHESIOLOGIST PRE-PROCEDURE ATTESTATION
Pre-Procedure Patient Identification:  I am the Primary Anesthesiologist and have identified the patient on 02/11/21 at 11:53 AM.   I have confirmed the following procedure(s) Cystoscopy, Right Ureteroscopy, Laser Lithotripsy, Stone Removal, Right Ureteral Stent Placement #[id number] (R) will be performed by the following surgeon/proceduralist Rickie Galvan MD.

## 2021-02-11 NOTE — PERIOPERATIVE NURSING NOTE
Pt called to Phase 2 asking for pain meds to be prescribed. Pt denies being in distress. Pt instructed to call Dr. Galvan's office directly to discuss with on-call physician. Urology moonlighter notified as well.

## 2021-02-11 NOTE — ANESTHESIA PREPROCEDURE EVALUATION
Relevant Problems   No relevant active problems       Anesthesia ROS/MED HX    Anesthesia History - neg  Pulmonary    asthma  Neuro/Psych - neg  Cardiovascular   Covid19 Test Reviewed and ECG reviewed   Normal ECG    GI/Hepatic- neg  Musculoskeletal- neg  Renal Disease- neg  Endo/Other  Body Habitus: Normal  ROS/MED HX Comments:    Pulmonary: Took inhaler this am- pt feels like his normal self       Past Surgical History:   Procedure Laterality Date   • COLONOSCOPY     • HERNIA REPAIR     • LIPOMA RESECTION     • LITHOTRIPSY     • OTHER SURGICAL HISTORY      facial surgery       Physical Exam    Airway   Mallampati: II   TM distance: >3 FB   Neck ROM: full  Cardiovascular - normal   Rhythm: regular   Rate: normalPulmonary - normal   clear to auscultation  Dental - normal        Anesthesia Plan    Plan: general    Technique: general LMA   ASA 2  Anesthetic plan and risks discussed with: patient  Induction:    intravenous   Postop Plan:   Patient Disposition: phase II then home   Pain Management: IV analgesics

## 2021-02-11 NOTE — ANESTHESIA POSTPROCEDURE EVALUATION
Patient: Herbie Lin    Procedure Summary     Date: 02/11/21 Room / Location: [facility] OR 09 / [facility] OR P[facility]    Anesthesia Start: 1223 Anesthesia Stop: 1305    Procedure: Cystoscopy, Right Ureteroscopy, Laser Lithotripsy, Stone Removal, Right Ureteral Stent Placement #[id number] (Right ) Diagnosis:       Calculus of ureter      (Calculus of ureter)    Surgeon: Rickie Galvan MD Responsible Provider: Wong, Corinne K, MD    Anesthesia Type: general ASA Status: 2          Anesthesia Type: general  PACU Vitals  2/11/2021 1300 - 2/11/2021 1332      2/11/2021  1305 2/11/2021  1310          BP:  (!) 142/85  133/82      Temp:  36.6 °C (97.8 °F)  --      Pulse:  62  60      Resp:  (!) 11  12              Anesthesia Post Evaluation    Pain score: 1  Pain management: adequate  Patient location during evaluation: PACU  Patient participation: complete - patient participated  Level of consciousness: awake and alert  Cardiovascular status: acceptable  Airway Patency: adequate  Respiratory status: acceptable  Hydration status: acceptable  Anesthetic complications: no

## 2021-02-11 NOTE — DISCHARGE INSTRUCTIONS
DISCHARGE INSTRUCTIONS FOR URETEROSCOPY      ACTIVITY:  • You should be physically active and try to do a little more each day to build your stamina.  You may walk and climb stairs without limitations.  • Prolonged sitting or lying in bed should be avoided     DRIVING:  • You should not drive while taking narcotic pain killers     PAIN:  • You can expect to have some pain that should be sufficiently controlled with Tylenol or Advil  • Burning and bleeding with urination are common after this procedure.  It will gradually become less intense after first few times you pass your urine.     MEDICATIONS:  • After surgery you should immediately resume your regular medications.  o If you regularly take aspirin or Plavix, you may resume it in 1 week.   • You may receive the following prescriptions:  • An antibiotic      DIET AND BOWELS:  • Drink plenty of fluids during the day.  Water is the best, but juices, coffee, tea are all acceptable.  The purpose is to increase the urine output enough to flush out any blood.  • Narcotic pain pills can cause constipation.  If needed, you may use over the counter oral stool softeners (such as miralax) to help your bowels get started.       YOU MAY HAVE AN INTERNAL STENT:  • This device is a long skinny tube that is placed during surgery that extends from the kidney down to the bladder.  It allows urine to drain while things heal.    • This temporary device is removed in the office 10 days after your surgery.  It is critical you return to have the stent removed as prolonged indwelling stents can result in encrustation by stone debris, infection, or loss of kidney function.  • While the stent is in place, you may experience “stent discomfort,” such as urinary frequency/urgency, blood in the urine or flank pain with urination.     WHEN TO CALL US:  • Temperature of 101.0 degrees or above (fevers <101 may be normal after surgery).  • Severe pain - not relieved by narcotic pain medication,  especially if it is associated with nausea, vomiting, or dizziness  • Pain, burning or other difficulty urinating     APPOINTMENTS:  • Ten days postoperatively for stent removal in the office.  • Interval appointments as needed for problems or concerns.

## 2021-02-11 NOTE — OP NOTE
OPERATIVE PROCEDURE NOTE    PATIENT NAME: Herbie Lin   MRN: [id number]  : 1970  DATE OF PROCEDURE: 2021      PRE-OPERATIVE DIAGNOSIS:   Right Ureteral Stone with Associated Hydroureteronephrosis        POST-OPERATIVE DIAGNOSIS:   Same        PROCEDURE:   Cystoscopy, right retrograde pyelogram, right ureteroscopy with laser lithotripsy of mid ureteral calculus, basket removal of stone fragment, insertion of right ureteral stent        SURGEON:   Rickie Galvan MD     ASSISTANT SURGEON:   Rajiv Sweet DO     ANESTHESIA: General LMA anesthesia        INDICATIONS:  The patient has a history of right flank pain secondary to a 6 mm distal ureteral calculus with obstruction.  He is now scheduled for ureteroscopy and laser lithotripsy.  Consent has been obtained after the risks were reviewed.        PROCEDURE IN DETAIL:  After obtaining informed consent and fully understanding the risks and benefits of the procedure the patient was transported from the preoperative holding area to the surgical suite.  Once in the surgical suite the patient was placed in the supine position and placed under anesthesia by the anesthesia department.  Sequential compression devices were confirmed to be positioned appropriately and functioning normally.  The patient received Ancef for preoperative antibiotic coverage.  The patient was then repositioned into a dorsal lithotomy position and then prepped and draped sterilely in the usual manner.  Appropriate timeout using 2 unique patient identifiers was then performed with all members of the surgical staff present.     Cystoscopy was performed after viscous lidocaine was instilled, and showed normal anterior and posterior urethra.  The prostate showed some lateral lobe tissue which was nonobstructive.  The bladder was inspected and showed no tumor, stone, or diverticulum.  Ureteral orifices were in normal position and normal configuration.  A right retrograde pyelogram was  performed and showed some hold-up of dye in the distal ureter consistent with location of the stone on CT scan.     A sensor wire was passed by the stone and over the sensor wire, a dual-lumen catheter was used to dilate the ureteral orifice.  A rigid ureteroscope was passed alongside the wire until the stone was visualized in the distal ureter.  A 365 µm holmium laser fiber was inserted and the stone fragmented to multiple pieces.  All the significant pieces were basketed with a 0 tip Nitinol basket and removed.     The safety wire was then backloaded through the cystoscope and under direct vision with fluoroscopic control a 7 x 26 cm double-pigtail stent was placed and confirmed to be in good position.    The patient was then awakened in stable condition and transported to the postoperative recovery area.      CONDITION:  Stable      ESTIMATED BLOOD LOSS:  None      COMPLICATIONS:  None      SPECIMENS:  ID Type Source Tests Collected by Time Destination   A : right ureteral stent Calculus Ureter, Right STONE ANALYSIS Rickie Galvan MD 2/11/2021 1250          DRAINS:  Double Pigtail Ureteral Stent  Implant Name Type Inv. Item Serial No.  Lot No. LRB No. Used Action   bard [id number] Stent   BARD [id number] Right 1 Implanted              Rickie Galvan MD  Phone Number: [phone]  2/11/2021  1:07 PM

## 2021-02-11 NOTE — ANESTHESIA PROCEDURE NOTES
Airway  Urgency: elective    Start Time: 2/11/2021 12:28 PM  Airway not difficult    General Information and Staff    Patient location during procedure: OR  Anesthesiologist: Wong, Corinne K, MD  Resident/CRNA: Sara Roman CRNA  Performed: resident/CRNA     Indications and Patient Condition  Indications for airway management: anesthesia  Sedation level: deep  Preoxygenated: yes  Patient position: sniffing  MILS maintained throughout  Mask difficulty assessment: 0 - not attempted    Final Airway Details  Final airway type: supraglottic airway      Successful airway: classic  Size 4    Number of attempts at approach: 1  Ventilation between attempts: none  Number of other approaches attempted: 0  Atraumatic airway insertion

## 2021-02-15 LAB
COMPN STONE: NORMAL
ORIGIN STONE: NORMAL
WT STONE: 0.03 G

## 2021-09-02 ENCOUNTER — APPOINTMENT (OUTPATIENT)
Dept: URBAN - METROPOLITAN AREA CLINIC 200 | Age: 51
Setting detail: DERMATOLOGY
End: 2021-09-06

## 2021-09-02 DIAGNOSIS — L57.8 OTHER SKIN CHANGES DUE TO CHRONIC EXPOSURE TO NONIONIZING RADIATION: ICD-10-CM

## 2021-09-02 DIAGNOSIS — T07XXXA INSECT BITE, NONVENOMOUS, OF OTHER, MULTIPLE, AND UNSPECIFIED SITES, WITHOUT MENTION OF INFECTION: ICD-10-CM

## 2021-09-02 DIAGNOSIS — Z11.52 ENCOUNTER FOR SCREENING FOR COVID-19: ICD-10-CM

## 2021-09-02 DIAGNOSIS — L57.0 ACTINIC KERATOSIS: ICD-10-CM

## 2021-09-02 PROBLEM — S40.861A INSECT BITE (NONVENOMOUS) OF RIGHT UPPER ARM, INITIAL ENCOUNTER: Status: ACTIVE | Noted: 2021-09-02

## 2021-09-02 PROCEDURE — 17000 DESTRUCT PREMALG LESION: CPT

## 2021-09-02 PROCEDURE — OTHER LIQUID NITROGEN: OTHER

## 2021-09-02 PROCEDURE — OTHER SCREENING FOR COVID-19: OTHER

## 2021-09-02 PROCEDURE — 99213 OFFICE O/P EST LOW 20 MIN: CPT | Mod: 25

## 2021-09-02 PROCEDURE — OTHER COUNSELING: OTHER

## 2021-09-02 PROCEDURE — OTHER SUNSCREEN RECOMMENDATIONS: OTHER

## 2021-09-02 PROCEDURE — OTHER REASSURANCE: OTHER

## 2021-09-02 ASSESSMENT — LOCATION DETAILED DESCRIPTION DERM
LOCATION DETAILED: PERIUMBILICAL SKIN
LOCATION DETAILED: LEFT FOREHEAD
LOCATION DETAILED: RIGHT ANTERIOR PROXIMAL UPPER ARM

## 2021-09-02 ASSESSMENT — LOCATION ZONE DERM
LOCATION ZONE: FACE
LOCATION ZONE: TRUNK
LOCATION ZONE: ARM

## 2021-09-02 ASSESSMENT — LOCATION SIMPLE DESCRIPTION DERM
LOCATION SIMPLE: LEFT FOREHEAD
LOCATION SIMPLE: ABDOMEN
LOCATION SIMPLE: RIGHT UPPER ARM

## 2021-09-02 ASSESSMENT — PAIN INTENSITY VAS: HOW INTENSE IS YOUR PAIN 0 BEING NO PAIN, 10 BEING THE MOST SEVERE PAIN POSSIBLE?: NO PAIN

## 2021-09-02 NOTE — PROCEDURE: LIQUID NITROGEN
Render Note In Bullet Format When Appropriate: No
Consent: The patient's consent was obtained including but not limited to risks of crusting, scabbing, blistering, scarring, darker or lighter pigmentary change, recurrence, incomplete removal and infection.
Detail Level: Detailed
Show Aperture Variable?: Yes
Post-Care Instructions: I reviewed with the patient in detail post-care instructions. Patient is to wear sunprotection, and avoid picking at any of the treated lesions. Pt may apply Vaseline to crusted or scabbing areas.
Number Of Freeze-Thaw Cycles: 1 freeze-thaw cycle
Duration Of Freeze Thaw-Cycle (Seconds): 2

## 2022-09-07 ENCOUNTER — APPOINTMENT (OUTPATIENT)
Dept: URBAN - METROPOLITAN AREA CLINIC 203 | Age: 52
Setting detail: DERMATOLOGY
End: 2022-09-08

## 2022-09-07 DIAGNOSIS — L57.8 OTHER SKIN CHANGES DUE TO CHRONIC EXPOSURE TO NONIONIZING RADIATION: ICD-10-CM

## 2022-09-07 DIAGNOSIS — Z11.52 ENCOUNTER FOR SCREENING FOR COVID-19: ICD-10-CM

## 2022-09-07 DIAGNOSIS — L82.1 OTHER SEBORRHEIC KERATOSIS: ICD-10-CM

## 2022-09-07 PROCEDURE — 99213 OFFICE O/P EST LOW 20 MIN: CPT

## 2022-09-07 PROCEDURE — OTHER SCREENING FOR COVID-19: OTHER

## 2022-09-07 PROCEDURE — OTHER COUNSELING: OTHER

## 2022-09-07 PROCEDURE — OTHER SUNSCREEN RECOMMENDATIONS: OTHER

## 2022-09-07 ASSESSMENT — LOCATION DETAILED DESCRIPTION DERM
LOCATION DETAILED: EPIGASTRIC SKIN
LOCATION DETAILED: LEFT LATERAL UPPER BACK
LOCATION DETAILED: LEFT MEDIAL INFERIOR CHEST

## 2022-09-07 ASSESSMENT — LOCATION SIMPLE DESCRIPTION DERM
LOCATION SIMPLE: LEFT UPPER BACK
LOCATION SIMPLE: ABDOMEN
LOCATION SIMPLE: CHEST

## 2022-09-07 ASSESSMENT — LOCATION ZONE DERM: LOCATION ZONE: TRUNK

## 2023-01-02 ENCOUNTER — APPOINTMENT (OUTPATIENT)
Dept: URBAN - METROPOLITAN AREA CLINIC 203 | Age: 53
Setting detail: DERMATOLOGY
End: 2023-01-15

## 2023-01-02 DIAGNOSIS — Z11.52 ENCOUNTER FOR SCREENING FOR COVID-19: ICD-10-CM

## 2023-01-02 DIAGNOSIS — L82.1 OTHER SEBORRHEIC KERATOSIS: ICD-10-CM

## 2023-01-02 PROCEDURE — OTHER REASSURANCE: OTHER

## 2023-01-02 PROCEDURE — OTHER SCREENING FOR COVID-19: OTHER

## 2023-01-02 PROCEDURE — OTHER COUNSELING: OTHER

## 2023-01-02 PROCEDURE — OTHER MIPS QUALITY: OTHER

## 2023-01-02 PROCEDURE — 99212 OFFICE O/P EST SF 10 MIN: CPT

## 2023-01-02 ASSESSMENT — LOCATION DETAILED DESCRIPTION DERM
LOCATION DETAILED: RIGHT CENTRAL TEMPLE
LOCATION DETAILED: EPIGASTRIC SKIN

## 2023-01-02 ASSESSMENT — LOCATION SIMPLE DESCRIPTION DERM
LOCATION SIMPLE: ABDOMEN
LOCATION SIMPLE: RIGHT TEMPLE

## 2023-01-02 ASSESSMENT — LOCATION ZONE DERM
LOCATION ZONE: TRUNK
LOCATION ZONE: FACE

## 2023-01-02 ASSESSMENT — PAIN INTENSITY VAS: HOW INTENSE IS YOUR PAIN 0 BEING NO PAIN, 10 BEING THE MOST SEVERE PAIN POSSIBLE?: NO PAIN

## 2023-01-02 NOTE — PROCEDURE: SCREENING FOR COVID-19
Has The Patient Been Infected With Covid-19 In The Past?: No
Detail Level: Simple
Previous Infection Text: The patient has recovered from a previous COVID-19 infection.
Has The Patient Been Vaccinated For Covid-19?: Yes
yes

## 2023-09-12 ENCOUNTER — APPOINTMENT (OUTPATIENT)
Dept: URBAN - METROPOLITAN AREA CLINIC 203 | Age: 53
Setting detail: DERMATOLOGY
End: 2023-09-14

## 2023-09-12 DIAGNOSIS — L57.8 OTHER SKIN CHANGES DUE TO CHRONIC EXPOSURE TO NONIONIZING RADIATION: ICD-10-CM

## 2023-09-12 DIAGNOSIS — L57.0 ACTINIC KERATOSIS: ICD-10-CM

## 2023-09-12 PROCEDURE — OTHER SUNSCREEN RECOMMENDATIONS: OTHER

## 2023-09-12 PROCEDURE — OTHER LIQUID NITROGEN: OTHER

## 2023-09-12 PROCEDURE — 99213 OFFICE O/P EST LOW 20 MIN: CPT | Mod: 25

## 2023-09-12 PROCEDURE — OTHER COUNSELING: OTHER

## 2023-09-12 PROCEDURE — 17000 DESTRUCT PREMALG LESION: CPT

## 2023-09-12 ASSESSMENT — LOCATION DETAILED DESCRIPTION DERM
LOCATION DETAILED: LEFT MEDIAL INFERIOR CHEST
LOCATION DETAILED: RIGHT LATERAL TEMPLE

## 2023-09-12 ASSESSMENT — LOCATION SIMPLE DESCRIPTION DERM
LOCATION SIMPLE: CHEST
LOCATION SIMPLE: RIGHT TEMPLE

## 2023-09-12 ASSESSMENT — PAIN INTENSITY VAS: HOW INTENSE IS YOUR PAIN 0 BEING NO PAIN, 10 BEING THE MOST SEVERE PAIN POSSIBLE?: NO PAIN

## 2023-09-12 ASSESSMENT — LOCATION ZONE DERM
LOCATION ZONE: TRUNK
LOCATION ZONE: FACE

## 2023-09-12 NOTE — PROCEDURE: LIQUID NITROGEN
Duration Of Freeze Thaw-Cycle (Seconds): 5
Show Applicator Variable?: Yes
Post-Care Instructions: I reviewed with the patient in detail post-care instructions. Patient is to wear sunprotection, and avoid picking at any of the treated lesions. Pt may apply Vaseline to crusted or scabbing areas.
Application Tool (Optional): Liquid Nitrogen Sprayer
Render Note In Bullet Format When Appropriate: No
Number Of Freeze-Thaw Cycles: 2 freeze-thaw cycles
Consent: The patient's consent was obtained including but not limited to risks of crusting, scabbing, blistering, scarring, darker or lighter pigmentary change, recurrence, incomplete removal and infection.
Detail Level: Zone

## 2024-10-25 ENCOUNTER — APPOINTMENT (OUTPATIENT)
Dept: URBAN - METROPOLITAN AREA CLINIC 203 | Age: 54
Setting detail: DERMATOLOGY
End: 2024-10-25

## 2024-10-25 DIAGNOSIS — L57.8 OTHER SKIN CHANGES DUE TO CHRONIC EXPOSURE TO NONIONIZING RADIATION: ICD-10-CM

## 2024-10-25 DIAGNOSIS — D22 MELANOCYTIC NEVI: ICD-10-CM

## 2024-10-25 DIAGNOSIS — D485 NEOPLASM OF UNCERTAIN BEHAVIOR OF SKIN: ICD-10-CM

## 2024-10-25 DIAGNOSIS — D18.0 HEMANGIOMA: ICD-10-CM

## 2024-10-25 DIAGNOSIS — L81.4 OTHER MELANIN HYPERPIGMENTATION: ICD-10-CM

## 2024-10-25 PROBLEM — D22.5 MELANOCYTIC NEVI OF TRUNK: Status: ACTIVE | Noted: 2024-10-25

## 2024-10-25 PROBLEM — D18.01 HEMANGIOMA OF SKIN AND SUBCUTANEOUS TISSUE: Status: ACTIVE | Noted: 2024-10-25

## 2024-10-25 PROBLEM — D48.5 NEOPLASM OF UNCERTAIN BEHAVIOR OF SKIN: Status: ACTIVE | Noted: 2024-10-25

## 2024-10-25 PROCEDURE — OTHER SUNSCREEN RECOMMENDATIONS: OTHER

## 2024-10-25 PROCEDURE — 11102 TANGNTL BX SKIN SINGLE LES: CPT

## 2024-10-25 PROCEDURE — OTHER COUNSELING: OTHER

## 2024-10-25 PROCEDURE — 99213 OFFICE O/P EST LOW 20 MIN: CPT | Mod: 25

## 2024-10-25 PROCEDURE — OTHER REASSURANCE: OTHER

## 2024-10-25 PROCEDURE — OTHER BIOPSY BY SHAVE METHOD: OTHER

## 2024-10-25 ASSESSMENT — LOCATION DETAILED DESCRIPTION DERM
LOCATION DETAILED: RIGHT MID-UPPER BACK
LOCATION DETAILED: RIGHT MEDIAL TRAPEZIAL NECK
LOCATION DETAILED: LEFT CLAVICULAR NECK
LOCATION DETAILED: LEFT MEDIAL BREAST 10-11:00 REGION
LOCATION DETAILED: LEFT MEDIAL BREAST 11-12:00 REGION
LOCATION DETAILED: LEFT CLAVICULAR SKIN

## 2024-10-25 ASSESSMENT — LOCATION ZONE DERM
LOCATION ZONE: NECK
LOCATION ZONE: TRUNK

## 2024-10-25 ASSESSMENT — LOCATION SIMPLE DESCRIPTION DERM
LOCATION SIMPLE: POSTERIOR NECK
LOCATION SIMPLE: LEFT CLAVICULAR SKIN
LOCATION SIMPLE: LEFT BREAST
LOCATION SIMPLE: LEFT ANTERIOR NECK
LOCATION SIMPLE: RIGHT UPPER BACK

## 2024-10-25 NOTE — PROCEDURE: BIOPSY BY SHAVE METHOD
Detail Level: Detailed
Depth Of Biopsy: dermis
Was A Bandage Applied: Yes
Size Of Lesion In Cm: 0
Biopsy Type: H and E
Biopsy Method: Dermablade
Anesthesia Type: 1% lidocaine with epinephrine
Anesthesia Volume In Cc: 0.5
Hemostasis: Drysol
Wound Care: Petrolatum
Dressing: bandage
Destruction After The Procedure: No
Type Of Destruction Used: Curettage
Curettage Text: The wound bed was treated with curettage after the biopsy was performed.
Cryotherapy Text: The wound bed was treated with cryotherapy after the biopsy was performed.
Electrodesiccation Text: The wound bed was treated with electrodesiccation after the biopsy was performed.
Electrodesiccation And Curettage Text: The wound bed was treated with electrodesiccation and curettage after the biopsy was performed.
Silver Nitrate Text: The wound bed was treated with silver nitrate after the biopsy was performed.
Lab: [id number]
Consent: Written consent was obtained and risks were reviewed including but not limited to scarring, infection, bleeding, scabbing, incomplete removal, nerve damage and allergy to anesthesia.
Post-Care Instructions: I reviewed with the patient in detail post-care instructions. Patient is to keep the biopsy site dry overnight, and then apply bacitracin twice daily until healed. Patient may apply hydrogen peroxide soaks to remove any crusting.
Notification Instructions: Patient will be notified of biopsy results. However, patient instructed to call the office if not contacted within 2 weeks.
Billing Type: Third-Party Bill
Information: Selecting Yes will display possible errors in your note based on the variables you have selected. This validation is only offered as a suggestion for you. PLEASE NOTE THAT THE VALIDATION TEXT WILL BE REMOVED WHEN YOU FINALIZE YOUR NOTE. IF YOU WANT TO FAX A PRELIMINARY NOTE YOU WILL NEED TO TOGGLE THIS TO 'NO' IF YOU DO NOT WANT IT IN YOUR FAXED NOTE.

## 2025-02-04 ENCOUNTER — TRANSCRIBE ORDERS (OUTPATIENT)
Dept: SCHEDULING | Age: 55
End: 2025-02-04

## 2025-02-04 DIAGNOSIS — R97.20 ELEVATED PROSTATE SPECIFIC ANTIGEN (PSA): Primary | ICD-10-CM

## 2025-02-10 ENCOUNTER — HOSPITAL ENCOUNTER (OUTPATIENT)
Dept: RADIOLOGY | Facility: CLINIC | Age: 55
Discharge: HOME | End: 2025-02-10
Attending: UROLOGY
Payer: COMMERCIAL

## 2025-02-10 DIAGNOSIS — R97.20 ELEVATED PROSTATE SPECIFIC ANTIGEN (PSA): ICD-10-CM

## 2025-02-10 RX ORDER — GADOBUTROL 604.72 MG/ML
0.1 INJECTION INTRAVENOUS ONCE
Status: COMPLETED | OUTPATIENT
Start: 2025-02-10 | End: 2025-02-10

## 2025-02-10 RX ADMIN — GADOBUTROL 7.5 ML: 604.72 INJECTION INTRAVENOUS at 13:35

## 2025-03-05 ENCOUNTER — HOSPITAL ENCOUNTER (OUTPATIENT)
Dept: RADIOLOGY | Facility: CLINIC | Age: 55
Discharge: HOME | End: 2025-03-05
Attending: UROLOGY
Payer: COMMERCIAL

## 2025-03-05 DIAGNOSIS — R93.49 ABNORMAL RADIOLOGIC FINDINGS ON DIAGNOSTIC IMAGING OF OTHER URINARY ORGANS: ICD-10-CM

## 2025-04-16 ENCOUNTER — TRANSCRIBE ORDERS (OUTPATIENT)
Dept: SCHEDULING | Age: 55
End: 2025-04-16

## 2025-04-16 DIAGNOSIS — C61 MALIGNANT NEOPLASM OF PROSTATE (CMS/HCC): Primary | ICD-10-CM

## 2025-04-21 ENCOUNTER — HOSPITAL ENCOUNTER (OUTPATIENT)
Dept: RADIOLOGY | Facility: HOSPITAL | Age: 55
Setting detail: NUCLEAR MEDICINE
Discharge: HOME | End: 2025-04-21
Attending: UROLOGY
Payer: COMMERCIAL

## 2025-04-21 DIAGNOSIS — C61 MALIGNANT NEOPLASM OF PROSTATE (CMS/HCC): ICD-10-CM

## 2025-04-21 PROCEDURE — A9503 TC99M MEDRONATE: HCPCS | Performed by: UROLOGY

## 2025-04-21 PROCEDURE — 78306 BONE IMAGING WHOLE BODY: CPT

## 2025-04-21 RX ADMIN — TECHNETIUM TC 99M MEDRONATE 27.5 MILLICURIE: 25 INJECTION, POWDER, FOR SOLUTION INTRAVENOUS at 10:04

## 2025-05-15 ENCOUNTER — HOSPITAL ENCOUNTER (OUTPATIENT)
Dept: RADIOLOGY | Facility: CLINIC | Age: 55
Discharge: HOME | End: 2025-05-15
Attending: STUDENT IN AN ORGANIZED HEALTH CARE EDUCATION/TRAINING PROGRAM
Payer: COMMERCIAL

## 2025-05-15 VITALS — BODY MASS INDEX: 23.62 KG/M2 | HEIGHT: 70 IN | WEIGHT: 165 LBS

## 2025-05-15 DIAGNOSIS — C61 MALIGNANT NEOPLASM OF PROSTATE (CMS/HCC): ICD-10-CM

## 2025-05-20 ENCOUNTER — HOSPITAL ENCOUNTER (OUTPATIENT)
Dept: RADIOLOGY | Facility: HOSPITAL | Age: 55
Discharge: HOME | End: 2025-05-20
Attending: STUDENT IN AN ORGANIZED HEALTH CARE EDUCATION/TRAINING PROGRAM
Payer: COMMERCIAL

## 2025-05-20 ENCOUNTER — APPOINTMENT (OUTPATIENT)
Dept: LAB | Facility: HOSPITAL | Age: 55
End: 2025-05-20
Attending: STUDENT IN AN ORGANIZED HEALTH CARE EDUCATION/TRAINING PROGRAM
Payer: COMMERCIAL

## 2025-05-20 ENCOUNTER — TRANSCRIBE ORDERS (OUTPATIENT)
Dept: REGISTRATION | Facility: HOSPITAL | Age: 55
End: 2025-05-20

## 2025-05-20 DIAGNOSIS — C61 MALIGNANT NEOPLASM OF PROSTATE (CMS/HCC): Primary | ICD-10-CM

## 2025-05-20 DIAGNOSIS — C61 MALIGNANT NEOPLASM OF PROSTATE (CMS/HCC): ICD-10-CM

## 2025-05-20 DIAGNOSIS — Z01.818 PREOP EXAMINATION: ICD-10-CM

## 2025-05-20 LAB
ABO + RH BLD: NORMAL
ALBUMIN SERPL-MCNC: 4.5 G/DL (ref 3.5–5.7)
ALP SERPL-CCNC: 51 IU/L (ref 34–125)
ALT SERPL-CCNC: 16 IU/L (ref 7–52)
ANION GAP SERPL CALC-SCNC: 5 MEQ/L (ref 3–15)
AST SERPL-CCNC: 16 IU/L (ref 13–39)
BASOPHILS # BLD: 0.05 K/UL (ref 0.01–0.1)
BASOPHILS NFR BLD: 0.8 %
BILIRUB SERPL-MCNC: 0.3 MG/DL (ref 0.3–1.2)
BLD GP AB SCN SERPL QL: NEGATIVE
BLOOD BANK CMNT PATIENT-IMP: NORMAL
BUN SERPL-MCNC: 16 MG/DL (ref 7–25)
CALCIUM SERPL-MCNC: 9.7 MG/DL (ref 8.6–10.3)
CHLORIDE SERPL-SCNC: 105 MEQ/L (ref 98–107)
CO2 SERPL-SCNC: 30 MEQ/L (ref 21–31)
CREAT SERPL-MCNC: 0.9 MG/DL (ref 0.7–1.3)
D AG BLD QL: POSITIVE
DIFFERENTIAL METHOD BLD: NORMAL
EGFRCR SERPLBLD CKD-EPI 2021: >60 ML/MIN/1.73M*2
EOSINOPHIL # BLD: 0.05 K/UL (ref 0.04–0.54)
EOSINOPHIL NFR BLD: 0.8 %
ERYTHROCYTE [DISTWIDTH] IN BLOOD BY AUTOMATED COUNT: 12.5 % (ref 11.6–14.4)
GLUCOSE SERPL-MCNC: 99 MG/DL (ref 70–99)
HCT VFR BLD AUTO: 44.3 % (ref 40.1–51)
HGB BLD-MCNC: 15.3 G/DL (ref 13.7–17.5)
IMM GRANULOCYTES # BLD AUTO: 0.03 K/UL (ref 0–0.08)
IMM GRANULOCYTES NFR BLD AUTO: 0.5 %
LABORATORY COMMENT REPORT: NORMAL
LYMPHOCYTES # BLD: 1.47 K/UL (ref 1.2–3.5)
LYMPHOCYTES NFR BLD: 23.6 %
MCH RBC QN AUTO: 32.8 PG (ref 28–33.2)
MCHC RBC AUTO-ENTMCNC: 34.5 G/DL (ref 32.2–36.5)
MCV RBC AUTO: 95.1 FL (ref 83–98)
MONOCYTES # BLD: 0.58 K/UL (ref 0.3–1)
MONOCYTES NFR BLD: 9.3 %
NEUTROPHILS # BLD: 4.06 K/UL (ref 1.7–7)
NEUTS SEG NFR BLD: 65 %
NRBC BLD-RTO: 0 %
PLATELET # BLD AUTO: 186 K/UL (ref 150–350)
PMV BLD AUTO: 10.3 FL (ref 9.4–12.4)
POTASSIUM SERPL-SCNC: 4.6 MEQ/L (ref 3.5–5.1)
PROT SERPL-MCNC: 6.6 G/DL (ref 6–8.2)
RBC # BLD AUTO: 4.66 M/UL (ref 4.5–5.8)
SODIUM SERPL-SCNC: 140 MEQ/L (ref 136–145)
SPECIMEN EXP DATE BLD: NORMAL
WBC # BLD AUTO: 6.24 K/UL (ref 3.8–10.5)

## 2025-05-20 PROCEDURE — 71046 X-RAY EXAM CHEST 2 VIEWS: CPT

## 2025-05-20 PROCEDURE — 36415 COLL VENOUS BLD VENIPUNCTURE: CPT

## 2025-05-20 PROCEDURE — 80053 COMPREHEN METABOLIC PANEL: CPT

## 2025-05-20 PROCEDURE — 85025 COMPLETE CBC W/AUTO DIFF WBC: CPT

## 2025-05-20 PROCEDURE — 86900 BLOOD TYPING SEROLOGIC ABO: CPT

## 2025-05-28 ENCOUNTER — PRE-ADMISSION TESTING (OUTPATIENT)
Dept: PREADMISSION TESTING | Age: 55
End: 2025-05-28
Payer: COMMERCIAL

## 2025-05-28 VITALS — HEIGHT: 70 IN | WEIGHT: 160 LBS | BODY MASS INDEX: 22.9 KG/M2

## 2025-05-28 RX ORDER — CETIRIZINE HYDROCHLORIDE 10 MG/1
10 TABLET ORAL DAILY
COMMUNITY

## 2025-05-29 ENCOUNTER — ANESTHESIA EVENT (OUTPATIENT)
Dept: OPERATING ROOM | Facility: HOSPITAL | Age: 55
Setting detail: HOSPITAL OUTPATIENT SURGERY
End: 2025-05-29
Payer: COMMERCIAL

## 2025-06-03 ENCOUNTER — ANESTHESIA (OUTPATIENT)
Dept: OPERATING ROOM | Facility: HOSPITAL | Age: 55
Setting detail: HOSPITAL OUTPATIENT SURGERY
End: 2025-06-03
Payer: COMMERCIAL

## 2025-06-03 ENCOUNTER — HOSPITAL ENCOUNTER (OUTPATIENT)
Facility: HOSPITAL | Age: 55
Setting detail: HOSPITAL OUTPATIENT SURGERY
Discharge: HOME | End: 2025-06-03
Attending: STUDENT IN AN ORGANIZED HEALTH CARE EDUCATION/TRAINING PROGRAM | Admitting: STUDENT IN AN ORGANIZED HEALTH CARE EDUCATION/TRAINING PROGRAM
Payer: COMMERCIAL

## 2025-06-03 VITALS
HEIGHT: 70 IN | DIASTOLIC BLOOD PRESSURE: 80 MMHG | TEMPERATURE: 97.1 F | BODY MASS INDEX: 23.77 KG/M2 | OXYGEN SATURATION: 98 % | RESPIRATION RATE: 15 BRPM | WEIGHT: 166 LBS | SYSTOLIC BLOOD PRESSURE: 142 MMHG | HEART RATE: 68 BPM

## 2025-06-03 DIAGNOSIS — C61 PROSTATE CANCER (CMS/HCC): ICD-10-CM

## 2025-06-03 LAB
ABO + RH BLD: NORMAL
D AG BLD QL: POSITIVE

## 2025-06-03 PROCEDURE — 07BC4ZX EXCISION OF PELVIS LYMPHATIC, PERCUTANEOUS ENDOSCOPIC APPROACH, DIAGNOSTIC: ICD-10-PCS | Performed by: STUDENT IN AN ORGANIZED HEALTH CARE EDUCATION/TRAINING PROGRAM

## 2025-06-03 PROCEDURE — 71000001 HC PACU PHASE 1 INITIAL 30MIN: Performed by: STUDENT IN AN ORGANIZED HEALTH CARE EDUCATION/TRAINING PROGRAM

## 2025-06-03 PROCEDURE — 71000012 HC PACU PHASE 2 EA ADDL MIN: Performed by: STUDENT IN AN ORGANIZED HEALTH CARE EDUCATION/TRAINING PROGRAM

## 2025-06-03 PROCEDURE — 27200000 HC STERILE SUPPLY: Performed by: STUDENT IN AN ORGANIZED HEALTH CARE EDUCATION/TRAINING PROGRAM

## 2025-06-03 PROCEDURE — 88304 TISSUE EXAM BY PATHOLOGIST: CPT | Performed by: STUDENT IN AN ORGANIZED HEALTH CARE EDUCATION/TRAINING PROGRAM

## 2025-06-03 PROCEDURE — 36415 COLL VENOUS BLD VENIPUNCTURE: CPT | Performed by: STUDENT IN AN ORGANIZED HEALTH CARE EDUCATION/TRAINING PROGRAM

## 2025-06-03 PROCEDURE — 25000000 HC PHARMACY GENERAL: Performed by: NURSE ANESTHETIST, CERTIFIED REGISTERED

## 2025-06-03 PROCEDURE — 36000006 HC OR LEVEL 6 INITIAL 30MIN: Performed by: STUDENT IN AN ORGANIZED HEALTH CARE EDUCATION/TRAINING PROGRAM

## 2025-06-03 PROCEDURE — 25800000 HC PHARMACY IV SOLUTIONS: Performed by: NURSE ANESTHETIST, CERTIFIED REGISTERED

## 2025-06-03 PROCEDURE — 36000016 HC OR LEVEL 6 EA ADDL MIN: Performed by: STUDENT IN AN ORGANIZED HEALTH CARE EDUCATION/TRAINING PROGRAM

## 2025-06-03 PROCEDURE — 25800000 HC PHARMACY IV SOLUTIONS: Performed by: STUDENT IN AN ORGANIZED HEALTH CARE EDUCATION/TRAINING PROGRAM

## 2025-06-03 PROCEDURE — 63600000 HC DRUGS/DETAIL CODE: Mod: JZ | Performed by: STUDENT IN AN ORGANIZED HEALTH CARE EDUCATION/TRAINING PROGRAM

## 2025-06-03 PROCEDURE — 0VTQ4ZZ RESECTION OF BILATERAL VAS DEFERENS, PERCUTANEOUS ENDOSCOPIC APPROACH: ICD-10-PCS | Performed by: STUDENT IN AN ORGANIZED HEALTH CARE EDUCATION/TRAINING PROGRAM

## 2025-06-03 PROCEDURE — 0VT04ZZ RESECTION OF PROSTATE, PERCUTANEOUS ENDOSCOPIC APPROACH: ICD-10-PCS | Performed by: STUDENT IN AN ORGANIZED HEALTH CARE EDUCATION/TRAINING PROGRAM

## 2025-06-03 PROCEDURE — 63600000 HC DRUGS/DETAIL CODE: Mod: JZ | Performed by: ANESTHESIOLOGY

## 2025-06-03 PROCEDURE — 71000011 HC PACU PHASE 1 EA ADDL MIN: Performed by: STUDENT IN AN ORGANIZED HEALTH CARE EDUCATION/TRAINING PROGRAM

## 2025-06-03 PROCEDURE — 71000002 HC PACU PHASE 2 INITIAL 30MIN: Performed by: STUDENT IN AN ORGANIZED HEALTH CARE EDUCATION/TRAINING PROGRAM

## 2025-06-03 PROCEDURE — 0VT34ZZ RESECTION OF BILATERAL SEMINAL VESICLES, PERCUTANEOUS ENDOSCOPIC APPROACH: ICD-10-PCS | Performed by: STUDENT IN AN ORGANIZED HEALTH CARE EDUCATION/TRAINING PROGRAM

## 2025-06-03 PROCEDURE — 63600000 HC DRUGS/DETAIL CODE: Mod: JZ | Performed by: NURSE ANESTHETIST, CERTIFIED REGISTERED

## 2025-06-03 PROCEDURE — 37000001 HC ANESTHESIA GENERAL: Performed by: STUDENT IN AN ORGANIZED HEALTH CARE EDUCATION/TRAINING PROGRAM

## 2025-06-03 PROCEDURE — 63700000 HC SELF-ADMINISTRABLE DRUG: Performed by: STUDENT IN AN ORGANIZED HEALTH CARE EDUCATION/TRAINING PROGRAM

## 2025-06-03 PROCEDURE — 25000000 HC PHARMACY GENERAL: Performed by: STUDENT IN AN ORGANIZED HEALTH CARE EDUCATION/TRAINING PROGRAM

## 2025-06-03 PROCEDURE — A4648 IMPLANTABLE TISSUE MARKER: HCPCS | Performed by: STUDENT IN AN ORGANIZED HEALTH CARE EDUCATION/TRAINING PROGRAM

## 2025-06-03 RX ORDER — DOCUSATE SODIUM 100 MG/1
100 CAPSULE, LIQUID FILLED ORAL 2 TIMES DAILY
Qty: 30 CAPSULE | Refills: 0 | Status: SHIPPED | OUTPATIENT
Start: 2025-06-03 | End: 2025-06-18

## 2025-06-03 RX ORDER — PHENYLEPHRINE HYDROCHLORIDE 10 MG/ML
INJECTION INTRAVENOUS AS NEEDED
Status: DISCONTINUED | OUTPATIENT
Start: 2025-06-03 | End: 2025-06-03 | Stop reason: SURG

## 2025-06-03 RX ORDER — PROPOFOL 10 MG/ML
INJECTION, EMULSION INTRAVENOUS AS NEEDED
Status: DISCONTINUED | OUTPATIENT
Start: 2025-06-03 | End: 2025-06-03 | Stop reason: SURG

## 2025-06-03 RX ORDER — CEFUROXIME AXETIL 250 MG/1
250 TABLET ORAL 2 TIMES DAILY
Qty: 14 TABLET | Refills: 0 | Status: SHIPPED | OUTPATIENT
Start: 2025-06-03 | End: 2025-06-10

## 2025-06-03 RX ORDER — MIDAZOLAM HYDROCHLORIDE 2 MG/2ML
INJECTION, SOLUTION INTRAMUSCULAR; INTRAVENOUS AS NEEDED
Status: DISCONTINUED | OUTPATIENT
Start: 2025-06-03 | End: 2025-06-03 | Stop reason: SURG

## 2025-06-03 RX ORDER — ONDANSETRON HYDROCHLORIDE 2 MG/ML
4 INJECTION, SOLUTION INTRAVENOUS EVERY 8 HOURS PRN
Status: DISCONTINUED | OUTPATIENT
Start: 2025-06-03 | End: 2025-06-03 | Stop reason: HOSPADM

## 2025-06-03 RX ORDER — BUPIVACAINE HYDROCHLORIDE 5 MG/ML
INJECTION, SOLUTION EPIDURAL; INTRACAUDAL; PERINEURAL
Status: DISCONTINUED | OUTPATIENT
Start: 2025-06-03 | End: 2025-06-03 | Stop reason: HOSPADM

## 2025-06-03 RX ORDER — DEXAMETHASONE SODIUM PHOSPHATE 4 MG/ML
INJECTION, SOLUTION INTRA-ARTICULAR; INTRALESIONAL; INTRAMUSCULAR; INTRAVENOUS; SOFT TISSUE AS NEEDED
Status: DISCONTINUED | OUTPATIENT
Start: 2025-06-03 | End: 2025-06-03 | Stop reason: SURG

## 2025-06-03 RX ORDER — LIDOCAINE HYDROCHLORIDE 20 MG/ML
INJECTION, SOLUTION INFILTRATION; PERINEURAL
Status: DISCONTINUED | OUTPATIENT
Start: 2025-06-03 | End: 2025-06-03 | Stop reason: HOSPADM

## 2025-06-03 RX ORDER — ACETAMINOPHEN 325 MG/1
650 TABLET ORAL EVERY 4 HOURS PRN
Status: DISCONTINUED | OUTPATIENT
Start: 2025-06-03 | End: 2025-06-03 | Stop reason: HOSPADM

## 2025-06-03 RX ORDER — FENTANYL CITRATE 50 UG/ML
50 INJECTION, SOLUTION INTRAMUSCULAR; INTRAVENOUS EVERY 5 MIN PRN
Status: COMPLETED | OUTPATIENT
Start: 2025-06-03 | End: 2025-06-03

## 2025-06-03 RX ORDER — ADHESIVE BANDAGE 7/8"
15-30 BANDAGE TOPICAL AS NEEDED
Status: DISCONTINUED | OUTPATIENT
Start: 2025-06-03 | End: 2025-06-03 | Stop reason: HOSPADM

## 2025-06-03 RX ORDER — GLYCOPYRROLATE 0.6MG/3ML
SYRINGE (ML) INTRAVENOUS AS NEEDED
Status: DISCONTINUED | OUTPATIENT
Start: 2025-06-03 | End: 2025-06-03 | Stop reason: SURG

## 2025-06-03 RX ORDER — KETOROLAC TROMETHAMINE 15 MG/ML
15 INJECTION, SOLUTION INTRAMUSCULAR; INTRAVENOUS EVERY 6 HOURS PRN
Status: DISCONTINUED | OUTPATIENT
Start: 2025-06-03 | End: 2025-06-03 | Stop reason: HOSPADM

## 2025-06-03 RX ORDER — OXYCODONE HYDROCHLORIDE 5 MG/1
5 TABLET ORAL EVERY 4 HOURS PRN
Status: DISCONTINUED | OUTPATIENT
Start: 2025-06-03 | End: 2025-06-03 | Stop reason: HOSPADM

## 2025-06-03 RX ORDER — SODIUM CHLORIDE 9 MG/ML
INJECTION, SOLUTION INTRAVENOUS CONTINUOUS PRN
Status: DISCONTINUED | OUTPATIENT
Start: 2025-06-03 | End: 2025-06-03 | Stop reason: SURG

## 2025-06-03 RX ORDER — ONDANSETRON 4 MG/1
4 TABLET, ORALLY DISINTEGRATING ORAL EVERY 8 HOURS PRN
Status: DISCONTINUED | OUTPATIENT
Start: 2025-06-03 | End: 2025-06-03 | Stop reason: HOSPADM

## 2025-06-03 RX ORDER — FENTANYL CITRATE 50 UG/ML
INJECTION, SOLUTION INTRAMUSCULAR; INTRAVENOUS AS NEEDED
Status: DISCONTINUED | OUTPATIENT
Start: 2025-06-03 | End: 2025-06-03 | Stop reason: SURG

## 2025-06-03 RX ORDER — ONDANSETRON HYDROCHLORIDE 2 MG/ML
INJECTION, SOLUTION INTRAVENOUS AS NEEDED
Status: DISCONTINUED | OUTPATIENT
Start: 2025-06-03 | End: 2025-06-03 | Stop reason: SURG

## 2025-06-03 RX ORDER — ROCURONIUM BROMIDE 10 MG/ML
INJECTION, SOLUTION INTRAVENOUS AS NEEDED
Status: DISCONTINUED | OUTPATIENT
Start: 2025-06-03 | End: 2025-06-03 | Stop reason: SURG

## 2025-06-03 RX ORDER — TRAMADOL HYDROCHLORIDE 50 MG/1
50 TABLET, FILM COATED ORAL EVERY 6 HOURS PRN
Qty: 15 TABLET | Refills: 0 | Status: SHIPPED | OUTPATIENT
Start: 2025-06-03 | End: 2025-06-08

## 2025-06-03 RX ORDER — ONDANSETRON HYDROCHLORIDE 2 MG/ML
4 INJECTION, SOLUTION INTRAVENOUS
Status: DISCONTINUED | OUTPATIENT
Start: 2025-06-03 | End: 2025-06-03 | Stop reason: HOSPADM

## 2025-06-03 RX ORDER — SODIUM CHLORIDE 9 MG/ML
INJECTION, SOLUTION INTRAVENOUS CONTINUOUS
Status: DISCONTINUED | OUTPATIENT
Start: 2025-06-03 | End: 2025-06-03 | Stop reason: HOSPADM

## 2025-06-03 RX ORDER — EPHEDRINE SULFATE 50 MG/ML
INJECTION, SOLUTION INTRAVENOUS AS NEEDED
Status: DISCONTINUED | OUTPATIENT
Start: 2025-06-03 | End: 2025-06-03 | Stop reason: SURG

## 2025-06-03 RX ORDER — DEXTROSE 40 %
15-30 GEL (GRAM) ORAL AS NEEDED
Status: DISCONTINUED | OUTPATIENT
Start: 2025-06-03 | End: 2025-06-03 | Stop reason: HOSPADM

## 2025-06-03 RX ORDER — LIDOCAINE HYDROCHLORIDE 10 MG/ML
INJECTION, SOLUTION INFILTRATION; PERINEURAL AS NEEDED
Status: DISCONTINUED | OUTPATIENT
Start: 2025-06-03 | End: 2025-06-03 | Stop reason: SURG

## 2025-06-03 RX ORDER — DEXTROSE 50 % IN WATER (D50W) INTRAVENOUS SYRINGE
25 AS NEEDED
Status: DISCONTINUED | OUTPATIENT
Start: 2025-06-03 | End: 2025-06-03 | Stop reason: HOSPADM

## 2025-06-03 RX ADMIN — FENTANYL CITRATE 50 MCG: 50 INJECTION INTRAMUSCULAR; INTRAVENOUS at 14:43

## 2025-06-03 RX ADMIN — FENTANYL CITRATE 100 MCG: 50 INJECTION, SOLUTION INTRAMUSCULAR; INTRAVENOUS at 10:56

## 2025-06-03 RX ADMIN — PROPOFOL 200 MG: 10 INJECTION, EMULSION INTRAVENOUS at 11:00

## 2025-06-03 RX ADMIN — MIDAZOLAM 2 MG: 1 INJECTION INTRAMUSCULAR; INTRAVENOUS at 10:52

## 2025-06-03 RX ADMIN — GLYCOPYRROLATE 0.2 MG: 0.2 INJECTION, SOLUTION INTRAMUSCULAR; INTRAVITREAL at 11:29

## 2025-06-03 RX ADMIN — FENTANYL CITRATE 50 MCG: 50 INJECTION, SOLUTION INTRAMUSCULAR; INTRAVENOUS at 13:19

## 2025-06-03 RX ADMIN — FENTANYL CITRATE 50 MCG: 50 INJECTION INTRAMUSCULAR; INTRAVENOUS at 13:46

## 2025-06-03 RX ADMIN — PHENYLEPHRINE HYDROCHLORIDE 100 MCG: 10 INJECTION INTRAVENOUS at 11:59

## 2025-06-03 RX ADMIN — KETOROLAC TROMETHAMINE 15 MG: 15 INJECTION, SOLUTION INTRAMUSCULAR; INTRAVENOUS at 14:36

## 2025-06-03 RX ADMIN — DEXAMETHASONE SODIUM PHOSPHATE 4 MG: 4 INJECTION, SOLUTION INTRA-ARTICULAR; INTRALESIONAL; INTRAMUSCULAR; INTRAVENOUS; SOFT TISSUE at 11:10

## 2025-06-03 RX ADMIN — CEFTRIAXONE SODIUM 1 G: 1 INJECTION, POWDER, FOR SOLUTION INTRAMUSCULAR; INTRAVENOUS at 09:58

## 2025-06-03 RX ADMIN — PHENYLEPHRINE HYDROCHLORIDE 100 MCG: 10 INJECTION INTRAVENOUS at 12:31

## 2025-06-03 RX ADMIN — ROCURONIUM BROMIDE 50 MG: 10 INJECTION INTRAVENOUS at 10:59

## 2025-06-03 RX ADMIN — ROCURONIUM BROMIDE 30 MG: 10 INJECTION INTRAVENOUS at 11:23

## 2025-06-03 RX ADMIN — EPHEDRINE SULFATE 10 MG: 50 INJECTION, SOLUTION INTRAVENOUS at 12:23

## 2025-06-03 RX ADMIN — SODIUM CHLORIDE: 9 INJECTION, SOLUTION INTRAVENOUS at 10:04

## 2025-06-03 RX ADMIN — ONDANSETRON 4 MG: 2 INJECTION INTRAMUSCULAR; INTRAVENOUS at 12:42

## 2025-06-03 RX ADMIN — FENTANYL CITRATE 25 MCG: 50 INJECTION, SOLUTION INTRAMUSCULAR; INTRAVENOUS at 12:40

## 2025-06-03 RX ADMIN — ACETAMINOPHEN 650 MG: 325 TABLET ORAL at 15:31

## 2025-06-03 RX ADMIN — PHENYLEPHRINE HYDROCHLORIDE 100 MCG: 10 INJECTION INTRAVENOUS at 11:47

## 2025-06-03 RX ADMIN — ONDANSETRON 4 MG: 4 TABLET, ORALLY DISINTEGRATING ORAL at 16:23

## 2025-06-03 RX ADMIN — FENTANYL CITRATE 25 MCG: 50 INJECTION, SOLUTION INTRAMUSCULAR; INTRAVENOUS at 11:30

## 2025-06-03 RX ADMIN — OXYCODONE HYDROCHLORIDE 5 MG: 5 TABLET ORAL at 15:58

## 2025-06-03 RX ADMIN — LIDOCAINE HYDROCHLORIDE 5 ML: 10 INJECTION, SOLUTION INFILTRATION; PERINEURAL at 10:59

## 2025-06-03 RX ADMIN — SUGAMMADEX 200 MG: 100 INJECTION, SOLUTION INTRAVENOUS at 13:14

## 2025-06-03 NOTE — DISCHARGE INSTRUCTIONS
DISCHARGE INSTRUCTIONS FOR ROBOTIC PROSTATECTOMY  WOUND CARE:  There are 6 small incisions. They are closed with absorbable sutures and surgical glue. There is no need for dressings or bandages. You may shower beginning the day after surgery.   NO swimming or tub baths until the catheter is removed.   Swelling or bruising is normal - especially around the penis or scrotum - and should resolve in 10-20 days.   Firmness or hardness under the incisions is normal; it may take several months to resolve.   The surgical soap (Chloraprep®) tints your skin orange and is designed to not wash off immediately. It will fade away in 2-3 weeks.   The surgical glue will peel off the incisions in about 2-4 weeks.  ACTIVITY:  You should be physically active and try to do a little more each day to build your stamina. You may walk and climb stairs without limitations.   You may not lift more than 15 pounds, or do any vigorous physical activity for 4 weeks (running, swimming, gym, golf, tennis, etc.). Engaging in this activity too soon may cause internal bleeding or hernia formation.   Even after 4 weeks, you may be uncomfortable with strenuous exercise or in a seated position (bicycling, horseback riding, motorcycling, etc.). This area corresponds to where your prostate gland used to be.  DRIVING:  You should not drive for at least one week. If, after one week, you are no longer using pain medications and returned to your baseline mobility, you should be able to drive.  PAIN:  It is normal to have a moderate amount of pain for the first 2-3 days. After that the discomfort should lessen rapidly.   Narcotic pain killers will be prescribed for you for moderate to severe pain. Most patients require very few narcotic pain killers, if any. Please use Tylenol 1000mg alternating with ibuprofen 600mg every 6 hours such that you are able to take something for pain every 3 hours.  MEDICATIONS:  After surgery you should immediately resume your  regular medications. If you regularly take aspirin, anti-inflammatories or other blood thinners (Coumadin) you should receive specific instructions from your surgeon or avoid them for 1 week post operatively.   You will receive 3 prescriptions:    An antibiotic for use until the catheter is removed.   A narcotic pain killer   Stool softeners (also available over-the-counter)  DIET AND BOWELS:  Eat lightly for the first 2-3 days. Avoid carbonated beverages (sodas) for the first week as they can worsen gas pains.   Once you are hungry, you may eat what you like. If your appetite has not returned, PLEASE don’t force yourself to eat heavily.   It is normal to have a slightly decreased appetite for the first few weeks.   Most men will not move their bowels for the first 4-5 days following surgery. Continue taking stool softeners until your bowels are moving regularly. Use narcotic pain medications sparingly as they can worsen constipation.   If needed, you may use oral laxatives to help your bowels get started. You may NOT use rectal laxatives (i.e. enemas). Milk of Magnesia 1oz. twice daily may be helpful if you have troubles moving your bowels.  CATHETER CARE:  You will be discharged with a catheter, a leg bag and a large (overnight) drainage bag. You should already have an appointment scheduled for catheter removal in approximately 1 week.   Always keep the bag below the level of the bladder - the catheter drains by gravity. Always make sure the catheter is not being pulled on - we don’t want the catheter to be removed accidentally.   Blood in the urine is common and can be seen during the first 6 weeks after surgery. If you do see blood in the urine, please drink plenty of water (to flush it out) and limit your activity slightly until the catheter clears up.   Leaking around the catheter is not unusual. It commonly happens during a bowel movement or with bladder spasms. Bladder spasms cause a sudden sensation that  you need to urinate, crampy pain and a sudden flow of urine into and around the catheter. Bladder spasms are uncomfortable, but harmless.   You may take the catheter and bag into the shower with you. Any material on the outside of the catheter can be washed off - please be sure to rinse any soap off completely to avoid penile irritation.  WHEN TO CALL US:  Temperature of 101.0 degrees or above (fevers <101 may be normal after surgery).   Severe pain - not relieved by narcotic pain medication.   Heavy bleeding from, worsening redness or separation of your incisions.   Thick, red urine (like ketchup) or large clots in the urine.   No urine output into the catheter bag for greater than 2 hours.  APPOINTMENTS:  One week postoperatively for catheter removal. Remember to wear your Depends before coming to the office.  .   Interval appointments as needed for problems or concerns.

## 2025-06-03 NOTE — ANESTHESIOLOGIST PRE-PROCEDURE ATTESTATION
Pre-Procedure Patient Identification:  I am the Primary Anesthesiologist and have identified the patient on 06/03/25 at 10:25 AM.   I have confirmed the procedure(s) will be performed by the following surgeon/proceduralist Jose Quintero MD.   Problem: Patient Education: Go to Patient Education Activity  Goal: Patient/Family Education  Outcome: Progressing Towards Goal     Problem: TIA/CVA Stroke: Day 2 Until Discharge  Goal: Activity/Safety  Outcome: Progressing Towards Goal  Goal: Diagnostic Test/Procedures  Outcome: Progressing Towards Goal  Goal: Nutrition/Diet  Outcome: Progressing Towards Goal

## 2025-06-03 NOTE — OR SURGEON
Pre-Procedure patient identification:  I am the primary operating surgeon/proceduralist and I have reviewed the applicable pathology reports and radiology studies for this procedure. I have identified the patient and confirmed laterality is bilateral on 06/03/25 at 9:46 AM Jose Quintero MD  Phone Number: [phone]

## 2025-06-03 NOTE — ANESTHESIA PREPROCEDURE EVALUATION
Relevant Problems   No relevant active problems       Anesthesia ROS/MED HX      Pulmonary    asthma  Cardiovascular   ECG reviewed  Endo/Other  History of cancer and prostate cancer  Body Habitus: Normal     Past Surgical History   Procedure Laterality Date   • Colonoscopy     • Cystoscopy, Right Ureteroscopy, Laser Lithotripsy, Stone Removal, Right Ureteral Stent Placement #[id number] Right 2/11/2021    Performed by Rickie Galvan MD at [facility] [place] OR [facility]   • Hernia repair Right     inguinal hernia   • Lipoma resection     • Lithotripsy     • Other surgical history      facial surgery- orif left jaw fracture   • Prostate biopsy         Physical Exam    Airway   Mallampati: I   TM distance: >3 FB   Neck ROM: full  Cardiovascular - normal   Rhythm: regular   Rate: normalPulmonary - normal   clear to auscultation  Dental    Teeth Problems: implants and caps    Anesthesia Plan    Plan: general    Technique: general endotracheal     Lines and Monitors: additional IV     Airway: oral intubation    2 ASA  Blood Products:     Use of Blood Products Discussed: Yes     Consented to blood products  Anesthetic plan and risks discussed with: patient  Induction:    intravenous   Postop Plan:   Patient Disposition: inpatient floor planned admission   Pain Management: IV analgesics

## 2025-06-03 NOTE — ANESTHESIA PROCEDURE NOTES
Airway  Reason: elective    Start Time: 6/3/2025 11:01 AM  Airway not difficult    General Information and Staff   Patient location during procedure: OR  Anesthesiologist: Ayleen Chahal MD  Resident/CRNA: Omaira Ibanez CRNA  Performed: resident/CRNA   Performed by: Omaira Ibanez CRNA  Authorized by: Ayleen Chahal MD        Patient Condition  Indications for airway management: anesthesia  Patient position: sniffing  Sedation level: deep     Final Airway Details   Preoxygenated: yes  Final airway type: endotracheal airway  Successful airway: ETT   Successful intubation technique: direct laryngoscopy  Adjuncts used in placement: intubating stylet  Endotracheal tube insertion site: oral  Blade: Natty  Blade size: #3  ETT size (mm): 7.5  Cormack-Lehane Classification: grade IIa - partial view of glottis  Placement verified by: chest auscultation and capnometry   Measured from: teeth  ETT to teeth (cm): 21  Number of attempts at approach: 1  Atraumatic airway insertion

## 2025-06-03 NOTE — OP NOTE
OPERATIVE PROCEDURE NOTE    PATIENT NAME: Herbie Lin   MRN: [id number]  : 1970  DATE OF PROCEDURE: 6/3/2025      PRE-OPERATIVE DIAGNOSIS:   Prostate Cancer       POST-OPERATIVE DIAGNOSIS:   Same      PROCEDURE:   Robotic Assisted Laparoscopic Prostatectomy with Bilateral Pelvic Lymph Node Dissection    SURGEON:   Jose Quintero MD      ANESTHESIA: General endotracheal anesthesia      INDICATIONS:  Herbie Lin is a 55 y.o.-year-old male with prostate cancer.   He is now brought to the OR for completion of a robotic prostatectomy and bilateral pelvic lymph node dissection.       PROCEDURE IN DETAIL:  Following wristband identification and written and verbal consent, the patient was taken to the operating room and placed on the operating room table in the supine position. Pneumatic compression stockings were placed. Ceftriaxone was given intravenously. General anesthesia was then induced. The patient was placed into the relaxed dorsal lithotomy position. All pressure points were padded. His arms were tucked at his sides, and the patient was secured to the table. A tilt test was performed confirming the patient to be secure on the operating table. Following this, the patient's lower abdomen and genitalia were then prepped and draped in usual sterile fashion. An 18-Turkish Lazaro catheter was inserted into the patient's bladder per urethra on the operative field. Following this, a standard robotic template was marked across the patient's abdomen just superior to the umbilicus. Access was obtained using a Veress needle, placed just superior to the umbilicus in the midline. There were two pops felt and both drop and irrigation tests proved negative and insufflation was carried out at low pressure. Once this was completed and adequate pressure was obtained, the robotic ports were placed, initially just superior to the umbilicus.  Inspection of the abdomen showed no evidence of needle trauma or injury.   Subsequently, each of the robotic ports was then placed under visual guidance in addition to a 5 mm assistant port just inferior to the right costal margin and a 12 mm port in the right lower quadrant. Each site was infiltrated with local anesthetic before insertion of each trocar.  The patient was then placed into the steep Trendelenburg position, and the robot was docked.    The remainder of the procedure was carried out from the console. The large intestine was elevated out of the patient's pelvis, and the vasa were identified bilaterally at the level of the internal inguinal ring. These were traced medially, and the peritoneum was opened over the posterior aspect of the bladder. The vasa were identified and traced to their origin at the prostate. Just lateral and posterior to these, rested the seminal vesicles, which were also dissected free from their attachments using a combination of blunt dissection and electrocautery. The inferior aspects of these were clipped using hemostatic clips. Once these were dissected free, they were elevated anteriorly, and the space behind the prostate was developed opening Denonvilliers fascia and dissecting distally using primarily blunt and sharp dissection with limited electrocautery. The lateral pedicles of the prostate were identified on either side of this. Attention was then turned to the pelvic lymph node dissection portion of the procedure.    The retropubic space was created by incising the peritoneum just lateral to the medial umbilical ligaments bilaterally. This was traced distally toward the level of the pubis, allowing the bladder to drop posteriorly. Care was taken to open the peritoneum to the level of the vasa laterally. The external iliac veins were identified bilaterally. The obturator nerve packets were grasped and elevated off of the sidewall using blunt dissection. Hemostatic clips were placed proximally and distally, taking care to avoid the obturator  nerve at all times, and these specimens were sent for pathological analysis. At no time was the obturator nerve impacted by the dissection.    At this point, attention was turned to the apical dissection.    The endopelvic fascia was identified and cleaned of any overlying fat. The endopelvic fascia was opened on either side of the prostate at the base. The levator fibers were dissected off the prostate using sharp dissection, extending from the base of the prostate distally toward the level of the urethra. Once this was clearly free, the dorsal venous complex was suture-ligated with a 2-0 Vicryl suture in a figure-of-eight fashion.    The bladder was placed on gentle superior traction. The bladder neck area was identified by manipulating the Lazaro catheter. Using electrocautery and sharp dissection, the bladder neck was opened at the level of the prostatovesical junction. Once this was opened, the Lazaro catheter was elevated through this and placed on anterior traction. The prostate was then circumferentially dissected off of the bladder laterally, taking care to maintain the bladder neck. The posterior aspect of the bladder neck was then divided using electrocautery, and dissection was carried out posteriorly between the prostate and the bladder to the level of the previously dissected seminal vesicles and vasa. Once these were identified, these were elevated through and placed on gentle anterior traction. The lateral pedicles of the prostate were then identified bilaterally and then clipped using hemostatic clips. Care was taken at all times to avoid the rectum. Once these pedicles were clipped bilaterally, the lateral prostatic fascia was opened, allowing the neurovascular bundles to be identified and dropped posteriorly, sparing both nerve bundles. The dissection was carried distally to the level of the urethra. At this point, the only remaining attachment was at the level of the urethra.    Again, with the  prostate on gentle superior traction, the urethra was opened just distal to the prostate. The Lazaro catheter was seen within this, and the urethra was then divided sharply. The specimen was then free and placed into a specimen bag.    The pelvis was then inspected.  There was no evidence of bleeding and the rectum remained completely intact without any concern for injury.    The bladder neck was inspected.  This was appropriately sized and the ureteral orifices were well away from the bladder neck. Using a double-armed 3-0 V-Loc suture, the anastomosis was run beginning at the 5 o'clock position, one arm running clockwise and the other arm running counterclockwise in standard fashion. The ureteral orifices were away from the anastamosis at all times. A watertight seal was obtained, and a new Lazaro catheter was placed. The anastomosis was tested, showing this to be leak-free. The incisions were secured anteriorly using Lapra-Tys. FloSeal was placed around the anastomosis. Attention was then turned to the closure.    The supraumbilical robotic port was then opened under direct vision allowing the prostate to be extracted through this. The fascia of this was then reapproximated in with 0 Maxon suture in a simple running fashion.  Subcutaneous tissues were then approximated using 3-0 Vicryl suture. All wounds were then irrigated and closed using 4-0 Monocryl in running subcuticular fashion. Dermabond was applied to all wounds. All needle, sponge, and instrument counts were correct. The procedure was then complete. The patient tolerated the procedure well. He was awakened from anesthesia and transferred to the recovery room in good and stable condition.    CONDITION:  Stable      ESTIMATED BLOOD LOSS:  50 mL      COMPLICATIONS:  None      SPECIMENS:  ID Type Source Tests Collected by Time Destination   1 : right pelvic lymph node packet Lymph Node Lymph Node (specify site) PATHOLOGY TISSUE EXAM Jose Quintero MD  6/3/2025 1141    2 : left pelvic lymph node packet Lymph Node Lymph Node (specify site) PATHOLOGY TISSUE EXAM Jose Quintero MD 6/3/2025 1142    3 : Pre-prostatic fat Tissue Prostate PATHOLOGY TISSUE EXAM Jose Quintero MD 6/3/2025 1142    4 : prostate and bilateral seminal vesicles Tissue Prostate PATHOLOGY TISSUE EXAM Jose Quintero MD 6/3/2025 1212          DRAINS:  18 Fr. Lazaro catheter             Jose Quintero MD  Phone Number: [phone]  6/3/2025  1:33 PM

## 2025-06-11 LAB
CASE RPRT: NORMAL
CLINICAL INFO: NORMAL
IMMUNE STAIN STUDY: NORMAL
PATH REPORT.ADDENDUM SPEC: NORMAL
PATH REPORT.FINAL DX SPEC: NORMAL
PATH REPORT.GROSS SPEC: NORMAL
PATHOLOGY SYNOPTIC REPORT: NORMAL

## (undated) DEVICE — SOLUTION ELECTROLUBE ANTI-STICK

## (undated) DEVICE — PAD POSITIONING XL W/ARM PROTECTORS

## (undated) DEVICE — SYRINGE TOOMEY 70ML SLIPTIP STERILE

## (undated) DEVICE — SLEEVE SCD COMFORT KNEE LENGTH MED

## (undated) DEVICE — TIPS SCISSOR MICROLINE LAP

## (undated) DEVICE — PAD GROUND ELECTROSURGICAL W/CORD

## (undated) DEVICE — SYRINGE DISP LUER-LOK 10 CC

## (undated) DEVICE — SUTURE VLOC 3-0 90 VIOLET 1X6 V-20

## (undated) DEVICE — BLADE SCALPEL #11

## (undated) DEVICE — SEAL UNIVERSAL 5MM-8MM XI

## (undated) DEVICE — SOLN IRRIG .9%SOD 3L

## (undated) DEVICE — Device

## (undated) DEVICE — SET IRRIGATION LEVEL I CYSTO

## (undated) DEVICE — ADHESIVE SKIN DERMABOND ADVANCED 0.7ML

## (undated) DEVICE — GLOVE SZ 7.5 PROTEXIS PI

## (undated) DEVICE — SOLN IRRIG STER WATER 500ML

## (undated) DEVICE — MARKER SURGICAL SKIN

## (undated) DEVICE — SUTURE POLYSORB 3-0 UNDYED 1X30 V-20

## (undated) DEVICE — SYSTEM VISUALIZATION CLEARIFY

## (undated) DEVICE — BULB PATHFINDER PLUS

## (undated) DEVICE — PACK RFID CYSTOSCOPY

## (undated) DEVICE — ADAPTER URETHERAL CATH

## (undated) DEVICE — DA VINCI INSUFFLATOR TUBE SET - SMOKE EVACUATION

## (undated) DEVICE — DRAPE POUCH IRRIGATION

## (undated) DEVICE — SOLN IRRIG .9%SOD 500ML

## (undated) DEVICE — PACK RFID LAP CHOLE PMH

## (undated) DEVICE — GOWN SURGICAL REINFORCED X-LAR

## (undated) DEVICE — DRIVER NEEDLE LRG XI REPOSABLE

## (undated) DEVICE — SHEARS CRVD HOT XI  REPOSABLE

## (undated) DEVICE — SUTURE POLYSORB 2-0 UNDYED 1X30 GS-21

## (undated) DEVICE — EVACUATOR PLUME-AWAY LAP SMOKE PKG

## (undated) DEVICE — ***USE 57698*** SLEEVE FLOWTRON DVT CALF SINGLE USE

## (undated) DEVICE — MANIFOLD SINGLE PORT NEPTUNE

## (undated) DEVICE — SUTURE VLOC 3-0 90 UNDYED 1X6 V-20

## (undated) DEVICE — CLIP LIGATING LAPRO ABSORBABLE CARTRIDGE

## (undated) DEVICE — CATH DUAL LUMEN

## (undated) DEVICE — SUTURE BIOSYN 4-0 UNDYED 1X18 P-12

## (undated) DEVICE — GLOVE SZ 7 LINER PROTEXIS PI BL

## (undated) DEVICE — STRYKEFLOW 2 W/ DISP TIP

## (undated) DEVICE — SPECIMN RETV DVC CATCH10 RELIACATCH 10MM

## (undated) DEVICE — DRAPE ARM DAVINCI XI

## (undated) DEVICE — SYRINGE 10CC NO NEEDLE ST

## (undated) DEVICE — SEALANT SURGICAL FLOSEAL 10ML STERILE PREP

## (undated) DEVICE — DRAPE IOBAN

## (undated) DEVICE — BAG URINARY DRAINAGE

## (undated) DEVICE — BLANKET UPPER BODY BAIR HUGGER

## (undated) DEVICE — GLOVE SZ 7 PROTEXIS PI

## (undated) DEVICE — FIBER HOLMIUM 200 MICRON SINGLE USE

## (undated) DEVICE — SUTURE MAXON 0 GREEN 1X30 GS-22

## (undated) DEVICE — OINTMENT SURGILUBE 4OZ TUBE

## (undated) DEVICE — DRAPE COLUMN DAVINCI XI

## (undated) DEVICE — TROCAR XCEL BLADELESS 12MM X 100MM LONG

## (undated) DEVICE — NEEDLE/VERRES 120MM    PN120

## (undated) DEVICE — MANIFOLD FOUR PORT NEPTUNE

## (undated) DEVICE — SOLN IV 0.9% NSS 1000ML

## (undated) DEVICE — TROCAR XCEL BLADELESS 5MM X 100MM LONG

## (undated) DEVICE — CATH OPEN ENDED 6FR

## (undated) DEVICE — COVER MAYO STAND

## (undated) DEVICE — GLOVE SZ 7.5 LINER PROTEXIS PI BL

## (undated) DEVICE — TUBING SMOKE EVAC PENCIL COATED

## (undated) DEVICE — BLADE CLIPPER DISPOSABLE

## (undated) DEVICE — SUTURE POLYSORB 2-0 VIOLET 1X30 GS-21

## (undated) DEVICE — CONTAINER SPECIMEN STERILE 5OZ

## (undated) DEVICE — COVER MAYO STAND ST 30/CS

## (undated) DEVICE — FORCEPS BIPOLAR MARYLAND XI REPOSABLE

## (undated) DEVICE — TROCAR BLADELESS OBTURATOR

## (undated) DEVICE — SOLN IRRIG .9%SOD 1000ML

## (undated) DEVICE — TOWEL SURGICAL W17XL27IN BLUE COTTON STANDARD PREWASHED DELI

## (undated) DEVICE — CLIPS HEMOLOK XL

## (undated) DEVICE — TRAY WET SKIN PREP PREMIUM

## (undated) DEVICE — HEEL  ELBOW PROTECTOR

## (undated) DEVICE — FORCEPS PROGRASP XI REPOSABLE

## (undated) DEVICE — SHEET DRAPE 3/4 REVERSEFOLD 53X77

## (undated) DEVICE — SHEARS TIP COVER DAVINCI ONE USE

## (undated) DEVICE — APPLICATOR CHLORAPREP 26ML ORANGE TINT